# Patient Record
Sex: FEMALE | Race: WHITE | NOT HISPANIC OR LATINO | Employment: FULL TIME | URBAN - METROPOLITAN AREA
[De-identification: names, ages, dates, MRNs, and addresses within clinical notes are randomized per-mention and may not be internally consistent; named-entity substitution may affect disease eponyms.]

---

## 2020-03-23 ENCOUNTER — HOSPITAL ENCOUNTER (EMERGENCY)
Facility: HOSPITAL | Age: 40
Discharge: HOME | End: 2020-03-23
Admitting: EMERGENCY MEDICINE

## 2020-03-23 VITALS
WEIGHT: 227.74 LBS | OXYGEN SATURATION: 97 % | HEIGHT: 64 IN | SYSTOLIC BLOOD PRESSURE: 101 MMHG | BODY MASS INDEX: 38.88 KG/M2 | DIASTOLIC BLOOD PRESSURE: 67 MMHG | TEMPERATURE: 98.4 F | RESPIRATION RATE: 16 BRPM | HEART RATE: 76 BPM

## 2020-03-23 DIAGNOSIS — R11.2 NAUSEA AND VOMITING, INTRACTABILITY OF VOMITING NOT SPECIFIED, UNSPECIFIED VOMITING TYPE: ICD-10-CM

## 2020-03-23 DIAGNOSIS — R10.84 GENERALIZED ABDOMINAL PAIN: Primary | ICD-10-CM

## 2020-03-23 LAB
ANION GAP SERPL CALCULATED.3IONS-SCNC: 15 MMOL/L (ref 5–15)
BASOPHILS # BLD AUTO: 0 10*3/MM3 (ref 0–0.2)
BASOPHILS NFR BLD AUTO: 0.7 % (ref 0–1.5)
BUN BLD-MCNC: 12 MG/DL (ref 6–20)
BUN/CREAT SERPL: 16.7 (ref 7–25)
CALCIUM SPEC-SCNC: 8.4 MG/DL (ref 8.6–10.5)
CHLORIDE SERPL-SCNC: 104 MMOL/L (ref 98–107)
CO2 SERPL-SCNC: 23 MMOL/L (ref 22–29)
CREAT BLD-MCNC: 0.72 MG/DL (ref 0.57–1)
DEPRECATED RDW RBC AUTO: 40.7 FL (ref 37–54)
EOSINOPHIL # BLD AUTO: 0 10*3/MM3 (ref 0–0.4)
EOSINOPHIL NFR BLD AUTO: 0 % (ref 0.3–6.2)
ERYTHROCYTE [DISTWIDTH] IN BLOOD BY AUTOMATED COUNT: 13.5 % (ref 12.3–15.4)
GFR SERPL CREATININE-BSD FRML MDRD: 90 ML/MIN/1.73
GLUCOSE BLD-MCNC: 99 MG/DL (ref 65–99)
HCT VFR BLD AUTO: 42.6 % (ref 34–46.6)
HGB BLD-MCNC: 14.2 G/DL (ref 12–15.9)
LYMPHOCYTES # BLD AUTO: 0.8 10*3/MM3 (ref 0.7–3.1)
LYMPHOCYTES NFR BLD AUTO: 19 % (ref 19.6–45.3)
MCH RBC QN AUTO: 28.7 PG (ref 26.6–33)
MCHC RBC AUTO-ENTMCNC: 33.4 G/DL (ref 31.5–35.7)
MCV RBC AUTO: 85.9 FL (ref 79–97)
MONOCYTES # BLD AUTO: 0.4 10*3/MM3 (ref 0.1–0.9)
MONOCYTES NFR BLD AUTO: 9.6 % (ref 5–12)
NEUTROPHILS # BLD AUTO: 2.9 10*3/MM3 (ref 1.7–7)
NEUTROPHILS NFR BLD AUTO: 70.7 % (ref 42.7–76)
NRBC BLD AUTO-RTO: 0.3 /100 WBC (ref 0–0.2)
PLATELET # BLD AUTO: 208 10*3/MM3 (ref 140–450)
PMV BLD AUTO: 8.4 FL (ref 6–12)
POTASSIUM BLD-SCNC: 3.6 MMOL/L (ref 3.5–5.2)
RBC # BLD AUTO: 4.96 10*6/MM3 (ref 3.77–5.28)
SODIUM BLD-SCNC: 142 MMOL/L (ref 136–145)
WBC NRBC COR # BLD: 4 10*3/MM3 (ref 3.4–10.8)

## 2020-03-23 PROCEDURE — 96374 THER/PROPH/DIAG INJ IV PUSH: CPT

## 2020-03-23 PROCEDURE — 80048 BASIC METABOLIC PNL TOTAL CA: CPT | Performed by: NURSE PRACTITIONER

## 2020-03-23 PROCEDURE — 25010000002 ONDANSETRON PER 1 MG: Performed by: NURSE PRACTITIONER

## 2020-03-23 PROCEDURE — 99283 EMERGENCY DEPT VISIT LOW MDM: CPT

## 2020-03-23 PROCEDURE — 85025 COMPLETE CBC W/AUTO DIFF WBC: CPT | Performed by: NURSE PRACTITIONER

## 2020-03-23 RX ORDER — SODIUM CHLORIDE 0.9 % (FLUSH) 0.9 %
10 SYRINGE (ML) INJECTION AS NEEDED
Status: DISCONTINUED | OUTPATIENT
Start: 2020-03-23 | End: 2020-03-24 | Stop reason: HOSPADM

## 2020-03-23 RX ORDER — DICYCLOMINE HYDROCHLORIDE 10 MG/1
10 CAPSULE ORAL 3 TIMES DAILY PRN
Qty: 30 CAPSULE | Refills: 0 | Status: SHIPPED | OUTPATIENT
Start: 2020-03-23

## 2020-03-23 RX ORDER — ONDANSETRON 4 MG/1
4 TABLET, ORALLY DISINTEGRATING ORAL EVERY 8 HOURS PRN
Qty: 20 TABLET | Refills: 0 | Status: SHIPPED | OUTPATIENT
Start: 2020-03-23

## 2020-03-23 RX ORDER — PROMETHAZINE HYDROCHLORIDE 25 MG/1
25 SUPPOSITORY RECTAL EVERY 6 HOURS PRN
Qty: 10 SUPPOSITORY | Refills: 0 | Status: ON HOLD | OUTPATIENT
Start: 2020-03-23 | End: 2020-04-02 | Stop reason: SDUPTHER

## 2020-03-23 RX ORDER — ONDANSETRON 2 MG/ML
4 INJECTION INTRAMUSCULAR; INTRAVENOUS ONCE
Status: COMPLETED | OUTPATIENT
Start: 2020-03-23 | End: 2020-03-23

## 2020-03-23 RX ADMIN — ONDANSETRON 4 MG: 2 INJECTION INTRAMUSCULAR; INTRAVENOUS at 22:05

## 2020-03-23 RX ADMIN — SODIUM CHLORIDE 1000 ML: 900 INJECTION, SOLUTION INTRAVENOUS at 22:05

## 2020-03-24 ENCOUNTER — APPOINTMENT (OUTPATIENT)
Dept: GENERAL RADIOLOGY | Facility: HOSPITAL | Age: 40
End: 2020-03-24

## 2020-03-24 ENCOUNTER — APPOINTMENT (OUTPATIENT)
Dept: CT IMAGING | Facility: HOSPITAL | Age: 40
End: 2020-03-24

## 2020-03-24 ENCOUNTER — HOSPITAL ENCOUNTER (INPATIENT)
Facility: HOSPITAL | Age: 40
LOS: 7 days | Discharge: HOME | End: 2020-04-02
Attending: INTERNAL MEDICINE | Admitting: INTERNAL MEDICINE

## 2020-03-24 DIAGNOSIS — R50.9 FEVER, UNSPECIFIED FEVER CAUSE: ICD-10-CM

## 2020-03-24 DIAGNOSIS — J18.9 PNEUMONIA OF BOTH LOWER LOBES DUE TO INFECTIOUS ORGANISM: ICD-10-CM

## 2020-03-24 DIAGNOSIS — R11.2 NAUSEA AND VOMITING, INTRACTABILITY OF VOMITING NOT SPECIFIED, UNSPECIFIED VOMITING TYPE: ICD-10-CM

## 2020-03-24 DIAGNOSIS — U07.1 PNEUMONIA DUE TO COVID-19 VIRUS: Primary | ICD-10-CM

## 2020-03-24 DIAGNOSIS — J12.82 PNEUMONIA DUE TO COVID-19 VIRUS: Primary | ICD-10-CM

## 2020-03-24 PROBLEM — Z20.822 SUSPECTED COVID-19 VIRUS INFECTION: Status: ACTIVE | Noted: 2020-03-24

## 2020-03-24 PROBLEM — J45.909 ASTHMA: Status: ACTIVE | Noted: 2020-03-24

## 2020-03-24 LAB
ALBUMIN SERPL-MCNC: 3.7 G/DL (ref 3.5–5.2)
ALBUMIN/GLOB SERPL: 1.3 G/DL
ALP SERPL-CCNC: 84 U/L (ref 39–117)
ALT SERPL W P-5'-P-CCNC: 36 U/L (ref 1–33)
ANION GAP SERPL CALCULATED.3IONS-SCNC: 14 MMOL/L (ref 5–15)
AST SERPL-CCNC: 38 U/L (ref 1–32)
B PERT DNA SPEC QL NAA+PROBE: NOT DETECTED
B-HCG UR QL: NEGATIVE
BASOPHILS # BLD AUTO: 0 10*3/MM3 (ref 0–0.2)
BASOPHILS NFR BLD AUTO: 0.3 % (ref 0–1.5)
BILIRUB SERPL-MCNC: 0.4 MG/DL (ref 0.2–1.2)
BILIRUB UR QL STRIP: NEGATIVE
BUN BLD-MCNC: 6 MG/DL (ref 6–20)
BUN/CREAT SERPL: 10 (ref 7–25)
C PNEUM DNA NPH QL NAA+NON-PROBE: NOT DETECTED
CALCIUM SPEC-SCNC: 8.3 MG/DL (ref 8.6–10.5)
CHLORIDE SERPL-SCNC: 107 MMOL/L (ref 98–107)
CLARITY UR: CLEAR
CO2 SERPL-SCNC: 22 MMOL/L (ref 22–29)
COLOR UR: YELLOW
CREAT BLD-MCNC: 0.6 MG/DL (ref 0.57–1)
CRP SERPL-MCNC: 4.48 MG/DL (ref 0–0.5)
DEPRECATED RDW RBC AUTO: 41.1 FL (ref 37–54)
EOSINOPHIL # BLD AUTO: 0 10*3/MM3 (ref 0–0.4)
EOSINOPHIL NFR BLD AUTO: 0.1 % (ref 0.3–6.2)
ERYTHROCYTE [DISTWIDTH] IN BLOOD BY AUTOMATED COUNT: 13.6 % (ref 12.3–15.4)
FLUAV H1 2009 PAND RNA NPH QL NAA+PROBE: NOT DETECTED
FLUAV H1 HA GENE NPH QL NAA+PROBE: NOT DETECTED
FLUAV H3 RNA NPH QL NAA+PROBE: NOT DETECTED
FLUAV SUBTYP SPEC NAA+PROBE: NOT DETECTED
FLUBV RNA ISLT QL NAA+PROBE: NOT DETECTED
GFR SERPL CREATININE-BSD FRML MDRD: 111 ML/MIN/1.73
GLOBULIN UR ELPH-MCNC: 2.8 GM/DL
GLUCOSE BLD-MCNC: 85 MG/DL (ref 65–99)
GLUCOSE UR STRIP-MCNC: NEGATIVE MG/DL
HADV DNA SPEC NAA+PROBE: NOT DETECTED
HCOV 229E RNA SPEC QL NAA+PROBE: NOT DETECTED
HCOV HKU1 RNA SPEC QL NAA+PROBE: NOT DETECTED
HCOV NL63 RNA SPEC QL NAA+PROBE: NOT DETECTED
HCOV OC43 RNA SPEC QL NAA+PROBE: NOT DETECTED
HCT VFR BLD AUTO: 39.2 % (ref 34–46.6)
HGB BLD-MCNC: 13.3 G/DL (ref 12–15.9)
HGB UR QL STRIP.AUTO: NEGATIVE
HMPV RNA NPH QL NAA+NON-PROBE: NOT DETECTED
HPIV1 RNA SPEC QL NAA+PROBE: NOT DETECTED
HPIV2 RNA SPEC QL NAA+PROBE: NOT DETECTED
HPIV3 RNA NPH QL NAA+PROBE: NOT DETECTED
HPIV4 P GENE NPH QL NAA+PROBE: NOT DETECTED
KETONES UR QL STRIP: ABNORMAL
LDH SERPL-CCNC: 299 U/L (ref 135–214)
LEUKOCYTE ESTERASE UR QL STRIP.AUTO: NEGATIVE
LIPASE SERPL-CCNC: 39 U/L (ref 13–60)
LYMPHOCYTES # BLD AUTO: 0.6 10*3/MM3 (ref 0.7–3.1)
LYMPHOCYTES NFR BLD AUTO: 17.2 % (ref 19.6–45.3)
M PNEUMO IGG SER IA-ACNC: NOT DETECTED
MCH RBC QN AUTO: 29.2 PG (ref 26.6–33)
MCHC RBC AUTO-ENTMCNC: 34.1 G/DL (ref 31.5–35.7)
MCV RBC AUTO: 85.8 FL (ref 79–97)
MONOCYTES # BLD AUTO: 0.3 10*3/MM3 (ref 0.1–0.9)
MONOCYTES NFR BLD AUTO: 8.5 % (ref 5–12)
NEUTROPHILS # BLD AUTO: 2.4 10*3/MM3 (ref 1.7–7)
NEUTROPHILS NFR BLD AUTO: 73.9 % (ref 42.7–76)
NITRITE UR QL STRIP: NEGATIVE
NRBC BLD AUTO-RTO: 0 /100 WBC (ref 0–0.2)
PH UR STRIP.AUTO: 6 [PH] (ref 5–8)
PLATELET # BLD AUTO: 184 10*3/MM3 (ref 140–450)
PMV BLD AUTO: 8.6 FL (ref 6–12)
POTASSIUM BLD-SCNC: 3.6 MMOL/L (ref 3.5–5.2)
PROCALCITONIN SERPL-MCNC: 0.04 NG/ML (ref 0.1–0.25)
PROT SERPL-MCNC: 6.5 G/DL (ref 6–8.5)
PROT UR QL STRIP: NEGATIVE
RBC # BLD AUTO: 4.57 10*6/MM3 (ref 3.77–5.28)
RHINOVIRUS RNA SPEC NAA+PROBE: NOT DETECTED
RSV RNA NPH QL NAA+NON-PROBE: NOT DETECTED
SODIUM BLD-SCNC: 143 MMOL/L (ref 136–145)
SP GR UR STRIP: 1.01 (ref 1–1.03)
UROBILINOGEN UR QL STRIP: ABNORMAL
WBC NRBC COR # BLD: 3.3 10*3/MM3 (ref 3.4–10.8)

## 2020-03-24 PROCEDURE — 99284 EMERGENCY DEPT VISIT MOD MDM: CPT

## 2020-03-24 PROCEDURE — 0099U HC BIOFIRE FILMARRAY RESP PANEL 1: CPT | Performed by: NURSE PRACTITIONER

## 2020-03-24 PROCEDURE — 71045 X-RAY EXAM CHEST 1 VIEW: CPT

## 2020-03-24 PROCEDURE — 25010000002 ONDANSETRON PER 1 MG

## 2020-03-24 PROCEDURE — 25010000002 ONDANSETRON PER 1 MG: Performed by: PHYSICIAN ASSISTANT

## 2020-03-24 PROCEDURE — 84145 PROCALCITONIN (PCT): CPT | Performed by: NURSE PRACTITIONER

## 2020-03-24 PROCEDURE — 70486 CT MAXILLOFACIAL W/O DYE: CPT

## 2020-03-24 PROCEDURE — G0378 HOSPITAL OBSERVATION PER HR: HCPCS

## 2020-03-24 PROCEDURE — 25010000002 CEFTRIAXONE PER 250 MG: Performed by: NURSE PRACTITIONER

## 2020-03-24 PROCEDURE — 85025 COMPLETE CBC W/AUTO DIFF WBC: CPT | Performed by: NURSE PRACTITIONER

## 2020-03-24 PROCEDURE — U0001 2019-NCOV DIAGNOSTIC P: HCPCS | Performed by: NURSE PRACTITIONER

## 2020-03-24 PROCEDURE — 83615 LACTATE (LD) (LDH) ENZYME: CPT | Performed by: NURSE PRACTITIONER

## 2020-03-24 PROCEDURE — 74176 CT ABD & PELVIS W/O CONTRAST: CPT

## 2020-03-24 PROCEDURE — 83690 ASSAY OF LIPASE: CPT | Performed by: NURSE PRACTITIONER

## 2020-03-24 PROCEDURE — 81025 URINE PREGNANCY TEST: CPT | Performed by: NURSE PRACTITIONER

## 2020-03-24 PROCEDURE — 87040 BLOOD CULTURE FOR BACTERIA: CPT | Performed by: NURSE PRACTITIONER

## 2020-03-24 PROCEDURE — 86140 C-REACTIVE PROTEIN: CPT | Performed by: NURSE PRACTITIONER

## 2020-03-24 PROCEDURE — 87635 SARS-COV-2 COVID-19 AMP PRB: CPT | Performed by: NURSE PRACTITIONER

## 2020-03-24 PROCEDURE — 80053 COMPREHEN METABOLIC PANEL: CPT | Performed by: NURSE PRACTITIONER

## 2020-03-24 PROCEDURE — 81003 URINALYSIS AUTO W/O SCOPE: CPT | Performed by: NURSE PRACTITIONER

## 2020-03-24 PROCEDURE — 25010000002 ONDANSETRON PER 1 MG: Performed by: NURSE PRACTITIONER

## 2020-03-24 PROCEDURE — 99219 PR INITIAL OBSERVATION CARE/DAY 50 MINUTES: CPT | Performed by: PHYSICIAN ASSISTANT

## 2020-03-24 RX ORDER — PROMETHAZINE HYDROCHLORIDE 25 MG/1
25 SUPPOSITORY RECTAL EVERY 6 HOURS PRN
Status: DISCONTINUED | OUTPATIENT
Start: 2020-03-24 | End: 2020-03-30

## 2020-03-24 RX ORDER — ALBUTEROL SULFATE 90 UG/1
2 AEROSOL, METERED RESPIRATORY (INHALATION) EVERY 4 HOURS PRN
Status: DISCONTINUED | OUTPATIENT
Start: 2020-03-24 | End: 2020-03-27

## 2020-03-24 RX ORDER — CHOLECALCIFEROL (VITAMIN D3) 125 MCG
5 CAPSULE ORAL NIGHTLY PRN
Status: DISCONTINUED | OUTPATIENT
Start: 2020-03-24 | End: 2020-04-02 | Stop reason: HOSPADM

## 2020-03-24 RX ORDER — CETIRIZINE HYDROCHLORIDE 10 MG/1
10 TABLET ORAL DAILY
Status: DISCONTINUED | OUTPATIENT
Start: 2020-03-25 | End: 2020-03-30

## 2020-03-24 RX ORDER — ONDANSETRON 2 MG/ML
4 INJECTION INTRAMUSCULAR; INTRAVENOUS ONCE
Status: COMPLETED | OUTPATIENT
Start: 2020-03-24 | End: 2020-03-24

## 2020-03-24 RX ORDER — SODIUM CHLORIDE 0.9 % (FLUSH) 0.9 %
10 SYRINGE (ML) INJECTION AS NEEDED
Status: DISCONTINUED | OUTPATIENT
Start: 2020-03-24 | End: 2020-04-02 | Stop reason: HOSPADM

## 2020-03-24 RX ORDER — PANTOPRAZOLE SODIUM 40 MG/1
40 TABLET, DELAYED RELEASE ORAL DAILY
Status: DISCONTINUED | OUTPATIENT
Start: 2020-03-25 | End: 2020-04-02 | Stop reason: HOSPADM

## 2020-03-24 RX ORDER — BUDESONIDE AND FORMOTEROL FUMARATE DIHYDRATE 160; 4.5 UG/1; UG/1
2 AEROSOL RESPIRATORY (INHALATION)
Status: DISCONTINUED | OUTPATIENT
Start: 2020-03-25 | End: 2020-04-02 | Stop reason: HOSPADM

## 2020-03-24 RX ORDER — PREGABALIN 100 MG/1
100 CAPSULE ORAL EVERY 12 HOURS SCHEDULED
Status: DISCONTINUED | OUTPATIENT
Start: 2020-03-25 | End: 2020-04-02 | Stop reason: HOSPADM

## 2020-03-24 RX ORDER — ONDANSETRON 4 MG/1
4 TABLET, FILM COATED ORAL EVERY 6 HOURS PRN
Status: DISCONTINUED | OUTPATIENT
Start: 2020-03-24 | End: 2020-04-02 | Stop reason: HOSPADM

## 2020-03-24 RX ORDER — PROCHLORPERAZINE EDISYLATE 5 MG/ML
5 INJECTION INTRAMUSCULAR; INTRAVENOUS EVERY 6 HOURS PRN
Status: DISCONTINUED | OUTPATIENT
Start: 2020-03-24 | End: 2020-04-02 | Stop reason: HOSPADM

## 2020-03-24 RX ORDER — ACETAMINOPHEN 650 MG/1
650 SUPPOSITORY RECTAL EVERY 4 HOURS PRN
Status: DISCONTINUED | OUTPATIENT
Start: 2020-03-24 | End: 2020-04-02 | Stop reason: HOSPADM

## 2020-03-24 RX ORDER — CALCIUM CARBONATE 200(500)MG
2 TABLET,CHEWABLE ORAL 2 TIMES DAILY PRN
Status: DISCONTINUED | OUTPATIENT
Start: 2020-03-24 | End: 2020-04-02 | Stop reason: HOSPADM

## 2020-03-24 RX ORDER — POTASSIUM CHLORIDE 20 MEQ/1
40 TABLET, EXTENDED RELEASE ORAL AS NEEDED
Status: DISCONTINUED | OUTPATIENT
Start: 2020-03-24 | End: 2020-04-02 | Stop reason: HOSPADM

## 2020-03-24 RX ORDER — OMEPRAZOLE 40 MG/1
40 CAPSULE, DELAYED RELEASE ORAL EVERY EVENING
COMMUNITY

## 2020-03-24 RX ORDER — SODIUM CHLORIDE 9 MG/ML
75 INJECTION, SOLUTION INTRAVENOUS CONTINUOUS
Status: DISCONTINUED | OUTPATIENT
Start: 2020-03-24 | End: 2020-03-25

## 2020-03-24 RX ORDER — MONTELUKAST SODIUM 10 MG/1
10 TABLET ORAL NIGHTLY
COMMUNITY

## 2020-03-24 RX ORDER — DOCUSATE SODIUM 100 MG/1
100 CAPSULE, LIQUID FILLED ORAL 2 TIMES DAILY PRN
Status: DISCONTINUED | OUTPATIENT
Start: 2020-03-24 | End: 2020-04-02 | Stop reason: HOSPADM

## 2020-03-24 RX ORDER — SODIUM CHLORIDE 0.9 % (FLUSH) 0.9 %
10 SYRINGE (ML) INJECTION EVERY 12 HOURS SCHEDULED
Status: DISCONTINUED | OUTPATIENT
Start: 2020-03-24 | End: 2020-04-02 | Stop reason: HOSPADM

## 2020-03-24 RX ORDER — MAGNESIUM SULFATE HEPTAHYDRATE 40 MG/ML
2 INJECTION, SOLUTION INTRAVENOUS AS NEEDED
Status: DISCONTINUED | OUTPATIENT
Start: 2020-03-24 | End: 2020-04-02 | Stop reason: HOSPADM

## 2020-03-24 RX ORDER — ALUMINA, MAGNESIA, AND SIMETHICONE 2400; 2400; 240 MG/30ML; MG/30ML; MG/30ML
15 SUSPENSION ORAL EVERY 6 HOURS PRN
Status: DISCONTINUED | OUTPATIENT
Start: 2020-03-24 | End: 2020-04-02 | Stop reason: HOSPADM

## 2020-03-24 RX ORDER — SODIUM CHLORIDE 0.9 % (FLUSH) 0.9 %
10 SYRINGE (ML) INJECTION AS NEEDED
Status: DISCONTINUED | OUTPATIENT
Start: 2020-03-24 | End: 2020-03-27 | Stop reason: SDUPTHER

## 2020-03-24 RX ORDER — POTASSIUM CHLORIDE 1.5 G/1.77G
40 POWDER, FOR SOLUTION ORAL AS NEEDED
Status: DISCONTINUED | OUTPATIENT
Start: 2020-03-24 | End: 2020-04-02 | Stop reason: HOSPADM

## 2020-03-24 RX ORDER — LORATADINE 10 MG/1
10 TABLET ORAL EVERY EVENING
COMMUNITY

## 2020-03-24 RX ORDER — IPRATROPIUM BROMIDE AND ALBUTEROL SULFATE 2.5; .5 MG/3ML; MG/3ML
3 SOLUTION RESPIRATORY (INHALATION) EVERY 4 HOURS PRN
Status: DISCONTINUED | OUTPATIENT
Start: 2020-03-24 | End: 2020-03-24

## 2020-03-24 RX ORDER — ACETAMINOPHEN 160 MG/5ML
650 SOLUTION ORAL EVERY 4 HOURS PRN
Status: DISCONTINUED | OUTPATIENT
Start: 2020-03-24 | End: 2020-04-02 | Stop reason: HOSPADM

## 2020-03-24 RX ORDER — BISACODYL 10 MG
10 SUPPOSITORY, RECTAL RECTAL DAILY PRN
Status: DISCONTINUED | OUTPATIENT
Start: 2020-03-24 | End: 2020-04-02 | Stop reason: HOSPADM

## 2020-03-24 RX ORDER — ONDANSETRON 2 MG/ML
4 INJECTION INTRAMUSCULAR; INTRAVENOUS ONCE
Status: DISCONTINUED | OUTPATIENT
Start: 2020-03-24 | End: 2020-03-24

## 2020-03-24 RX ORDER — ONDANSETRON 2 MG/ML
INJECTION INTRAMUSCULAR; INTRAVENOUS
Status: COMPLETED
Start: 2020-03-24 | End: 2020-03-24

## 2020-03-24 RX ORDER — MAGNESIUM SULFATE HEPTAHYDRATE 40 MG/ML
4 INJECTION, SOLUTION INTRAVENOUS AS NEEDED
Status: DISCONTINUED | OUTPATIENT
Start: 2020-03-24 | End: 2020-04-02 | Stop reason: HOSPADM

## 2020-03-24 RX ORDER — ONDANSETRON 2 MG/ML
4 INJECTION INTRAMUSCULAR; INTRAVENOUS EVERY 6 HOURS PRN
Status: DISCONTINUED | OUTPATIENT
Start: 2020-03-24 | End: 2020-04-02 | Stop reason: HOSPADM

## 2020-03-24 RX ORDER — DICYCLOMINE HYDROCHLORIDE 10 MG/1
10 CAPSULE ORAL 3 TIMES DAILY PRN
Status: DISCONTINUED | OUTPATIENT
Start: 2020-03-24 | End: 2020-03-30

## 2020-03-24 RX ORDER — PREGABALIN 100 MG/1
100 CAPSULE ORAL 2 TIMES DAILY
COMMUNITY

## 2020-03-24 RX ORDER — ACETAMINOPHEN 500 MG
1000 TABLET ORAL ONCE
Status: COMPLETED | OUTPATIENT
Start: 2020-03-24 | End: 2020-03-24

## 2020-03-24 RX ORDER — MONTELUKAST SODIUM 10 MG/1
10 TABLET ORAL NIGHTLY
Status: DISCONTINUED | OUTPATIENT
Start: 2020-03-25 | End: 2020-04-02 | Stop reason: HOSPADM

## 2020-03-24 RX ORDER — ACETAMINOPHEN 325 MG/1
650 TABLET ORAL EVERY 4 HOURS PRN
Status: DISCONTINUED | OUTPATIENT
Start: 2020-03-24 | End: 2020-04-02 | Stop reason: HOSPADM

## 2020-03-24 RX ADMIN — ONDANSETRON 4 MG: 2 INJECTION INTRAMUSCULAR; INTRAVENOUS at 18:01

## 2020-03-24 RX ADMIN — CEFTRIAXONE SODIUM 1 G: 10 INJECTION, POWDER, FOR SOLUTION INTRAVENOUS at 22:38

## 2020-03-24 RX ADMIN — ONDANSETRON 4 MG: 2 INJECTION INTRAMUSCULAR; INTRAVENOUS at 20:05

## 2020-03-24 RX ADMIN — ONDANSETRON 4 MG: 2 INJECTION INTRAMUSCULAR; INTRAVENOUS at 23:21

## 2020-03-24 RX ADMIN — ACETAMINOPHEN 1000 MG: 500 TABLET, FILM COATED ORAL at 19:15

## 2020-03-24 RX ADMIN — SODIUM CHLORIDE 1000 ML: 900 INJECTION, SOLUTION INTRAVENOUS at 18:01

## 2020-03-24 RX ADMIN — SODIUM CHLORIDE 75 ML/HR: 900 INJECTION, SOLUTION INTRAVENOUS at 22:38

## 2020-03-24 RX ADMIN — Medication 10 ML: at 22:47

## 2020-03-25 LAB
ANION GAP SERPL CALCULATED.3IONS-SCNC: 14 MMOL/L (ref 5–15)
BASOPHILS # BLD AUTO: 0 10*3/MM3 (ref 0–0.2)
BASOPHILS NFR BLD AUTO: 0.4 % (ref 0–1.5)
BUN BLD-MCNC: 6 MG/DL (ref 6–20)
BUN/CREAT SERPL: 10 (ref 7–25)
CALCIUM SPEC-SCNC: 7.3 MG/DL (ref 8.6–10.5)
CHLORIDE SERPL-SCNC: 111 MMOL/L (ref 98–107)
CO2 SERPL-SCNC: 20 MMOL/L (ref 22–29)
CREAT BLD-MCNC: 0.6 MG/DL (ref 0.57–1)
DEPRECATED RDW RBC AUTO: 40.3 FL (ref 37–54)
EOSINOPHIL # BLD AUTO: 0 10*3/MM3 (ref 0–0.4)
EOSINOPHIL NFR BLD AUTO: 0.2 % (ref 0.3–6.2)
ERYTHROCYTE [DISTWIDTH] IN BLOOD BY AUTOMATED COUNT: 13.4 % (ref 12.3–15.4)
GFR SERPL CREATININE-BSD FRML MDRD: 111 ML/MIN/1.73
GLUCOSE BLD-MCNC: 74 MG/DL (ref 65–99)
HCT VFR BLD AUTO: 38.2 % (ref 34–46.6)
HGB BLD-MCNC: 13.1 G/DL (ref 12–15.9)
LARGE PLATELETS: NORMAL
LYMPHOCYTES # BLD AUTO: 0.6 10*3/MM3 (ref 0.7–3.1)
LYMPHOCYTES NFR BLD AUTO: 16.5 % (ref 19.6–45.3)
MCH RBC QN AUTO: 29.4 PG (ref 26.6–33)
MCHC RBC AUTO-ENTMCNC: 34.3 G/DL (ref 31.5–35.7)
MCV RBC AUTO: 85.5 FL (ref 79–97)
MONOCYTES # BLD AUTO: 0.4 10*3/MM3 (ref 0.1–0.9)
MONOCYTES NFR BLD AUTO: 9.8 % (ref 5–12)
NEUTROPHILS # BLD AUTO: 2.7 10*3/MM3 (ref 1.7–7)
NEUTROPHILS NFR BLD AUTO: 73.1 % (ref 42.7–76)
NRBC BLD AUTO-RTO: 0.5 /100 WBC (ref 0–0.2)
PLATELET # BLD AUTO: 170 10*3/MM3 (ref 140–450)
PMV BLD AUTO: 9.2 FL (ref 6–12)
POTASSIUM BLD-SCNC: 3.3 MMOL/L (ref 3.5–5.2)
POTASSIUM BLD-SCNC: 3.7 MMOL/L (ref 3.5–5.2)
RBC # BLD AUTO: 4.47 10*6/MM3 (ref 3.77–5.28)
RBC MORPH BLD: NORMAL
SODIUM BLD-SCNC: 145 MMOL/L (ref 136–145)
WBC MORPH BLD: NORMAL
WBC NRBC COR # BLD: 3.7 10*3/MM3 (ref 3.4–10.8)

## 2020-03-25 PROCEDURE — 25010000002 CEFTRIAXONE PER 250 MG: Performed by: PHYSICIAN ASSISTANT

## 2020-03-25 PROCEDURE — 84132 ASSAY OF SERUM POTASSIUM: CPT | Performed by: INTERNAL MEDICINE

## 2020-03-25 PROCEDURE — 99226 PR SBSQ OBSERVATION CARE/DAY 35 MINUTES: CPT | Performed by: INTERNAL MEDICINE

## 2020-03-25 PROCEDURE — 94799 UNLISTED PULMONARY SVC/PX: CPT

## 2020-03-25 PROCEDURE — 85025 COMPLETE CBC W/AUTO DIFF WBC: CPT | Performed by: PHYSICIAN ASSISTANT

## 2020-03-25 PROCEDURE — 25010000002 PROCHLORPERAZINE 10 MG/2ML SOLUTION: Performed by: PHYSICIAN ASSISTANT

## 2020-03-25 PROCEDURE — 25010000002 ONDANSETRON PER 1 MG: Performed by: PHYSICIAN ASSISTANT

## 2020-03-25 PROCEDURE — 25010000002 ENOXAPARIN PER 10 MG: Performed by: INTERNAL MEDICINE

## 2020-03-25 PROCEDURE — 85007 BL SMEAR W/DIFF WBC COUNT: CPT | Performed by: PHYSICIAN ASSISTANT

## 2020-03-25 PROCEDURE — 80048 BASIC METABOLIC PNL TOTAL CA: CPT | Performed by: PHYSICIAN ASSISTANT

## 2020-03-25 PROCEDURE — G0378 HOSPITAL OBSERVATION PER HR: HCPCS

## 2020-03-25 RX ORDER — AZITHROMYCIN 250 MG/1
500 TABLET, FILM COATED ORAL DAILY
Status: COMPLETED | OUTPATIENT
Start: 2020-03-25 | End: 2020-03-25

## 2020-03-25 RX ORDER — AZITHROMYCIN 250 MG/1
250 TABLET, FILM COATED ORAL EVERY 24 HOURS
Status: COMPLETED | OUTPATIENT
Start: 2020-03-26 | End: 2020-03-29

## 2020-03-25 RX ORDER — SODIUM CHLORIDE 9 MG/ML
50 INJECTION, SOLUTION INTRAVENOUS CONTINUOUS
Status: DISCONTINUED | OUTPATIENT
Start: 2020-03-25 | End: 2020-03-26

## 2020-03-25 RX ADMIN — PREGABALIN 100 MG: 100 CAPSULE ORAL at 20:42

## 2020-03-25 RX ADMIN — Medication 10 ML: at 20:42

## 2020-03-25 RX ADMIN — MONTELUKAST SODIUM 10 MG: 10 TABLET, FILM COATED ORAL at 20:42

## 2020-03-25 RX ADMIN — ACETAMINOPHEN 650 MG: 325 TABLET ORAL at 21:49

## 2020-03-25 RX ADMIN — BUDESONIDE AND FORMOTEROL FUMARATE DIHYDRATE 2 PUFF: 160; 4.5 AEROSOL RESPIRATORY (INHALATION) at 09:26

## 2020-03-25 RX ADMIN — PREGABALIN 100 MG: 100 CAPSULE ORAL at 10:53

## 2020-03-25 RX ADMIN — ALBUTEROL SULFATE 2 PUFF: 90 AEROSOL, METERED RESPIRATORY (INHALATION) at 17:49

## 2020-03-25 RX ADMIN — SODIUM CHLORIDE 50 ML/HR: 0.9 INJECTION, SOLUTION INTRAVENOUS at 20:56

## 2020-03-25 RX ADMIN — ACETAMINOPHEN 650 MG: 325 TABLET ORAL at 04:32

## 2020-03-25 RX ADMIN — SODIUM CHLORIDE 75 ML/HR: 900 INJECTION, SOLUTION INTRAVENOUS at 04:31

## 2020-03-25 RX ADMIN — ENOXAPARIN SODIUM 40 MG: 40 INJECTION SUBCUTANEOUS at 16:32

## 2020-03-25 RX ADMIN — PROCHLORPERAZINE EDISYLATE 5 MG: 5 INJECTION INTRAMUSCULAR; INTRAVENOUS at 00:06

## 2020-03-25 RX ADMIN — ALBUTEROL SULFATE 2 PUFF: 90 AEROSOL, METERED RESPIRATORY (INHALATION) at 09:26

## 2020-03-25 RX ADMIN — AZITHROMYCIN MONOHYDRATE 500 MG: 250 TABLET ORAL at 14:12

## 2020-03-25 RX ADMIN — ONDANSETRON 4 MG: 2 INJECTION INTRAMUSCULAR; INTRAVENOUS at 05:05

## 2020-03-25 RX ADMIN — BUDESONIDE AND FORMOTEROL FUMARATE DIHYDRATE 2 PUFF: 160; 4.5 AEROSOL RESPIRATORY (INHALATION) at 17:49

## 2020-03-25 RX ADMIN — CETIRIZINE HYDROCHLORIDE 10 MG: 10 TABLET, FILM COATED ORAL at 08:47

## 2020-03-25 RX ADMIN — POTASSIUM CHLORIDE 40 MEQ: 1500 TABLET, EXTENDED RELEASE ORAL at 10:53

## 2020-03-25 RX ADMIN — PANTOPRAZOLE SODIUM 40 MG: 40 TABLET, DELAYED RELEASE ORAL at 08:47

## 2020-03-25 RX ADMIN — POTASSIUM CHLORIDE 40 MEQ: 1500 TABLET, EXTENDED RELEASE ORAL at 08:47

## 2020-03-25 RX ADMIN — PROCHLORPERAZINE EDISYLATE 5 MG: 5 INJECTION INTRAMUSCULAR; INTRAVENOUS at 08:46

## 2020-03-25 RX ADMIN — CEFTRIAXONE SODIUM 1 G: 10 INJECTION, POWDER, FOR SOLUTION INTRAVENOUS at 20:42

## 2020-03-26 ENCOUNTER — APPOINTMENT (OUTPATIENT)
Dept: CT IMAGING | Facility: HOSPITAL | Age: 40
End: 2020-03-26

## 2020-03-26 LAB
ALBUMIN SERPL-MCNC: 3.4 G/DL (ref 3.5–5.2)
ALBUMIN/GLOB SERPL: 1.3 G/DL
ALP SERPL-CCNC: 79 U/L (ref 39–117)
ALT SERPL W P-5'-P-CCNC: 39 U/L (ref 1–33)
ANION GAP SERPL CALCULATED.3IONS-SCNC: 10 MMOL/L (ref 5–15)
AST SERPL-CCNC: 42 U/L (ref 1–32)
BASOPHILS # BLD AUTO: 0 10*3/MM3 (ref 0–0.2)
BASOPHILS NFR BLD AUTO: 0.6 % (ref 0–1.5)
BILIRUB SERPL-MCNC: 0.2 MG/DL (ref 0.2–1.2)
BUN BLD-MCNC: 4 MG/DL (ref 6–20)
BUN/CREAT SERPL: 6.3 (ref 7–25)
CALCIUM SPEC-SCNC: 7.8 MG/DL (ref 8.6–10.5)
CHLORIDE SERPL-SCNC: 107 MMOL/L (ref 98–107)
CO2 SERPL-SCNC: 26 MMOL/L (ref 22–29)
CREAT BLD-MCNC: 0.64 MG/DL (ref 0.57–1)
DEPRECATED RDW RBC AUTO: 41.6 FL (ref 37–54)
EOSINOPHIL # BLD AUTO: 0 10*3/MM3 (ref 0–0.4)
EOSINOPHIL NFR BLD AUTO: 0.1 % (ref 0.3–6.2)
ERYTHROCYTE [DISTWIDTH] IN BLOOD BY AUTOMATED COUNT: 13.7 % (ref 12.3–15.4)
GFR SERPL CREATININE-BSD FRML MDRD: 103 ML/MIN/1.73
GLOBULIN UR ELPH-MCNC: 2.6 GM/DL
GLUCOSE BLD-MCNC: 108 MG/DL (ref 65–99)
HCT VFR BLD AUTO: 36.9 % (ref 34–46.6)
HGB BLD-MCNC: 12.7 G/DL (ref 12–15.9)
L PNEUMO1 AG UR QL IA: NEGATIVE
LYMPHOCYTES # BLD AUTO: 1.1 10*3/MM3 (ref 0.7–3.1)
LYMPHOCYTES NFR BLD AUTO: 34.7 % (ref 19.6–45.3)
MCH RBC QN AUTO: 29.4 PG (ref 26.6–33)
MCHC RBC AUTO-ENTMCNC: 34.4 G/DL (ref 31.5–35.7)
MCV RBC AUTO: 85.6 FL (ref 79–97)
MONOCYTES # BLD AUTO: 0.4 10*3/MM3 (ref 0.1–0.9)
MONOCYTES NFR BLD AUTO: 11.5 % (ref 5–12)
NEUTROPHILS # BLD AUTO: 1.7 10*3/MM3 (ref 1.7–7)
NEUTROPHILS NFR BLD AUTO: 53.1 % (ref 42.7–76)
NRBC BLD AUTO-RTO: 0.1 /100 WBC (ref 0–0.2)
PLATELET # BLD AUTO: 190 10*3/MM3 (ref 140–450)
PMV BLD AUTO: 8.6 FL (ref 6–12)
POTASSIUM BLD-SCNC: 3.6 MMOL/L (ref 3.5–5.2)
PROT SERPL-MCNC: 6 G/DL (ref 6–8.5)
RBC # BLD AUTO: 4.32 10*6/MM3 (ref 3.77–5.28)
REF LAB TEST METHOD: ABNORMAL
S PNEUM AG SPEC QL LA: NEGATIVE
SARS-COV-2 RNA RESP QL NAA+PROBE: DETECTED
SODIUM BLD-SCNC: 143 MMOL/L (ref 136–145)
WBC NRBC COR # BLD: 3.2 10*3/MM3 (ref 3.4–10.8)

## 2020-03-26 PROCEDURE — 80053 COMPREHEN METABOLIC PANEL: CPT | Performed by: INTERNAL MEDICINE

## 2020-03-26 PROCEDURE — 94799 UNLISTED PULMONARY SVC/PX: CPT

## 2020-03-26 PROCEDURE — 25010000002 ENOXAPARIN PER 10 MG: Performed by: INTERNAL MEDICINE

## 2020-03-26 PROCEDURE — 85025 COMPLETE CBC W/AUTO DIFF WBC: CPT | Performed by: PHYSICIAN ASSISTANT

## 2020-03-26 PROCEDURE — 71250 CT THORAX DX C-: CPT

## 2020-03-26 PROCEDURE — 87899 AGENT NOS ASSAY W/OPTIC: CPT | Performed by: INTERNAL MEDICINE

## 2020-03-26 PROCEDURE — 93005 ELECTROCARDIOGRAM TRACING: CPT | Performed by: INTERNAL MEDICINE

## 2020-03-26 PROCEDURE — 99233 SBSQ HOSP IP/OBS HIGH 50: CPT | Performed by: INTERNAL MEDICINE

## 2020-03-26 RX ORDER — HYDROXYCHLOROQUINE SULFATE 200 MG/1
200 TABLET, FILM COATED ORAL EVERY 12 HOURS SCHEDULED
Status: DISCONTINUED | OUTPATIENT
Start: 2020-03-27 | End: 2020-03-27

## 2020-03-26 RX ORDER — HYDROXYCHLOROQUINE SULFATE 200 MG/1
400 TABLET, FILM COATED ORAL EVERY 12 HOURS SCHEDULED
Status: COMPLETED | OUTPATIENT
Start: 2020-03-26 | End: 2020-03-27

## 2020-03-26 RX ADMIN — ALBUTEROL SULFATE 2 PUFF: 90 AEROSOL, METERED RESPIRATORY (INHALATION) at 21:30

## 2020-03-26 RX ADMIN — PREGABALIN 100 MG: 100 CAPSULE ORAL at 08:05

## 2020-03-26 RX ADMIN — MONTELUKAST SODIUM 10 MG: 10 TABLET, FILM COATED ORAL at 20:18

## 2020-03-26 RX ADMIN — BUDESONIDE AND FORMOTEROL FUMARATE DIHYDRATE 2 PUFF: 160; 4.5 AEROSOL RESPIRATORY (INHALATION) at 06:28

## 2020-03-26 RX ADMIN — ACETAMINOPHEN 650 MG: 325 TABLET ORAL at 21:30

## 2020-03-26 RX ADMIN — PANTOPRAZOLE SODIUM 40 MG: 40 TABLET, DELAYED RELEASE ORAL at 08:04

## 2020-03-26 RX ADMIN — PREGABALIN 100 MG: 100 CAPSULE ORAL at 20:18

## 2020-03-26 RX ADMIN — Medication 10 ML: at 20:18

## 2020-03-26 RX ADMIN — BUDESONIDE AND FORMOTEROL FUMARATE DIHYDRATE 2 PUFF: 160; 4.5 AEROSOL RESPIRATORY (INHALATION) at 21:30

## 2020-03-26 RX ADMIN — CETIRIZINE HYDROCHLORIDE 10 MG: 10 TABLET, FILM COATED ORAL at 08:04

## 2020-03-26 RX ADMIN — ACETAMINOPHEN 650 MG: 325 TABLET ORAL at 17:33

## 2020-03-26 RX ADMIN — ENOXAPARIN SODIUM 40 MG: 40 INJECTION SUBCUTANEOUS at 15:35

## 2020-03-26 RX ADMIN — ACETAMINOPHEN 650 MG: 325 TABLET ORAL at 08:05

## 2020-03-26 RX ADMIN — ALBUTEROL SULFATE 2 PUFF: 90 AEROSOL, METERED RESPIRATORY (INHALATION) at 06:28

## 2020-03-26 RX ADMIN — HYDROXYCHLOROQUINE SULFATE 400 MG: 200 TABLET ORAL at 15:35

## 2020-03-26 RX ADMIN — AZITHROMYCIN MONOHYDRATE 250 MG: 250 TABLET ORAL at 13:20

## 2020-03-27 LAB
ALBUMIN SERPL-MCNC: 3.1 G/DL (ref 3.5–5.2)
ALBUMIN/GLOB SERPL: 1.2 G/DL
ALP SERPL-CCNC: 75 U/L (ref 39–117)
ALT SERPL W P-5'-P-CCNC: 40 U/L (ref 1–33)
ANION GAP SERPL CALCULATED.3IONS-SCNC: 11 MMOL/L (ref 5–15)
AST SERPL-CCNC: 38 U/L (ref 1–32)
BACTERIA UR QL AUTO: ABNORMAL /HPF
BASOPHILS # BLD AUTO: 0 10*3/MM3 (ref 0–0.2)
BASOPHILS NFR BLD AUTO: 0.3 % (ref 0–1.5)
BILIRUB SERPL-MCNC: 0.2 MG/DL (ref 0.2–1.2)
BILIRUB UR QL STRIP: NEGATIVE
BUN BLD-MCNC: 4 MG/DL (ref 6–20)
BUN/CREAT SERPL: 7.4 (ref 7–25)
CALCIUM SPEC-SCNC: 8 MG/DL (ref 8.6–10.5)
CHLORIDE SERPL-SCNC: 103 MMOL/L (ref 98–107)
CLARITY UR: CLEAR
CO2 SERPL-SCNC: 26 MMOL/L (ref 22–29)
COLOR UR: YELLOW
CREAT BLD-MCNC: 0.54 MG/DL (ref 0.57–1)
D DIMER PPP FEU-MCNC: 0.48 MCGFEU/ML (ref 0.17–0.59)
DEPRECATED RDW RBC AUTO: 41.6 FL (ref 37–54)
EOSINOPHIL # BLD AUTO: 0 10*3/MM3 (ref 0–0.4)
EOSINOPHIL NFR BLD AUTO: 0 % (ref 0.3–6.2)
ERYTHROCYTE [DISTWIDTH] IN BLOOD BY AUTOMATED COUNT: 13.7 % (ref 12.3–15.4)
FERRITIN SERPL-MCNC: 660.9 NG/ML (ref 13–150)
GFR SERPL CREATININE-BSD FRML MDRD: 126 ML/MIN/1.73
GLOBULIN UR ELPH-MCNC: 2.6 GM/DL
GLUCOSE BLD-MCNC: 119 MG/DL (ref 65–99)
GLUCOSE UR STRIP-MCNC: NEGATIVE MG/DL
HCT VFR BLD AUTO: 37.6 % (ref 34–46.6)
HGB BLD-MCNC: 12.7 G/DL (ref 12–15.9)
HGB UR QL STRIP.AUTO: ABNORMAL
HYALINE CASTS UR QL AUTO: ABNORMAL /LPF
KETONES UR QL STRIP: NEGATIVE
LEUKOCYTE ESTERASE UR QL STRIP.AUTO: NEGATIVE
LYMPHOCYTES # BLD AUTO: 0.8 10*3/MM3 (ref 0.7–3.1)
LYMPHOCYTES NFR BLD AUTO: 17.1 % (ref 19.6–45.3)
MCH RBC QN AUTO: 29.1 PG (ref 26.6–33)
MCHC RBC AUTO-ENTMCNC: 33.7 G/DL (ref 31.5–35.7)
MCV RBC AUTO: 86.3 FL (ref 79–97)
MONOCYTES # BLD AUTO: 0.4 10*3/MM3 (ref 0.1–0.9)
MONOCYTES NFR BLD AUTO: 8.1 % (ref 5–12)
NEUTROPHILS # BLD AUTO: 3.3 10*3/MM3 (ref 1.7–7)
NEUTROPHILS NFR BLD AUTO: 74.5 % (ref 42.7–76)
NITRITE UR QL STRIP: NEGATIVE
NRBC BLD AUTO-RTO: 0.1 /100 WBC (ref 0–0.2)
PH UR STRIP.AUTO: 7 [PH] (ref 5–8)
PLATELET # BLD AUTO: 187 10*3/MM3 (ref 140–450)
PMV BLD AUTO: 8.3 FL (ref 6–12)
POTASSIUM BLD-SCNC: 3.3 MMOL/L (ref 3.5–5.2)
POTASSIUM BLD-SCNC: 3.8 MMOL/L (ref 3.5–5.2)
PROT SERPL-MCNC: 5.7 G/DL (ref 6–8.5)
PROT UR QL STRIP: ABNORMAL
RBC # BLD AUTO: 4.35 10*6/MM3 (ref 3.77–5.28)
RBC # UR: ABNORMAL /HPF
REF LAB TEST METHOD: ABNORMAL
SODIUM BLD-SCNC: 140 MMOL/L (ref 136–145)
SP GR UR STRIP: 1.01 (ref 1–1.03)
SQUAMOUS #/AREA URNS HPF: ABNORMAL /HPF
UROBILINOGEN UR QL STRIP: ABNORMAL
WBC NRBC COR # BLD: 4.5 10*3/MM3 (ref 3.4–10.8)
WBC UR QL AUTO: ABNORMAL /HPF

## 2020-03-27 PROCEDURE — 25010000002 METHYLPREDNISOLONE PER 40 MG: Performed by: INTERNAL MEDICINE

## 2020-03-27 PROCEDURE — 82728 ASSAY OF FERRITIN: CPT | Performed by: INTERNAL MEDICINE

## 2020-03-27 PROCEDURE — 25010000002 ENOXAPARIN PER 10 MG: Performed by: INTERNAL MEDICINE

## 2020-03-27 PROCEDURE — 84132 ASSAY OF SERUM POTASSIUM: CPT | Performed by: INTERNAL MEDICINE

## 2020-03-27 PROCEDURE — 80053 COMPREHEN METABOLIC PANEL: CPT | Performed by: INTERNAL MEDICINE

## 2020-03-27 PROCEDURE — 94799 UNLISTED PULMONARY SVC/PX: CPT

## 2020-03-27 PROCEDURE — 63710000001 ONDANSETRON PER 8 MG: Performed by: PHYSICIAN ASSISTANT

## 2020-03-27 PROCEDURE — 99233 SBSQ HOSP IP/OBS HIGH 50: CPT | Performed by: INTERNAL MEDICINE

## 2020-03-27 PROCEDURE — 81001 URINALYSIS AUTO W/SCOPE: CPT | Performed by: INTERNAL MEDICINE

## 2020-03-27 PROCEDURE — 93005 ELECTROCARDIOGRAM TRACING: CPT | Performed by: INTERNAL MEDICINE

## 2020-03-27 PROCEDURE — 25010000002 PROCHLORPERAZINE 10 MG/2ML SOLUTION: Performed by: PHYSICIAN ASSISTANT

## 2020-03-27 PROCEDURE — 85025 COMPLETE CBC W/AUTO DIFF WBC: CPT | Performed by: PHYSICIAN ASSISTANT

## 2020-03-27 PROCEDURE — 85379 FIBRIN DEGRADATION QUANT: CPT | Performed by: INTERNAL MEDICINE

## 2020-03-27 RX ORDER — ALBUTEROL SULFATE 90 UG/1
2 AEROSOL, METERED RESPIRATORY (INHALATION)
Status: DISCONTINUED | OUTPATIENT
Start: 2020-03-27 | End: 2020-04-02 | Stop reason: HOSPADM

## 2020-03-27 RX ORDER — SODIUM CHLORIDE 9 MG/ML
30 INJECTION, SOLUTION INTRAVENOUS CONTINUOUS
Status: DISCONTINUED | OUTPATIENT
Start: 2020-03-27 | End: 2020-04-01

## 2020-03-27 RX ORDER — METHYLPREDNISOLONE SODIUM SUCCINATE 40 MG/ML
40 INJECTION, POWDER, LYOPHILIZED, FOR SOLUTION INTRAMUSCULAR; INTRAVENOUS ONCE
Status: COMPLETED | OUTPATIENT
Start: 2020-03-27 | End: 2020-03-27

## 2020-03-27 RX ADMIN — CETIRIZINE HYDROCHLORIDE 10 MG: 10 TABLET, FILM COATED ORAL at 08:45

## 2020-03-27 RX ADMIN — ALBUTEROL SULFATE 2 PUFF: 90 AEROSOL, METERED RESPIRATORY (INHALATION) at 18:55

## 2020-03-27 RX ADMIN — PROCHLORPERAZINE EDISYLATE 5 MG: 5 INJECTION INTRAMUSCULAR; INTRAVENOUS at 12:59

## 2020-03-27 RX ADMIN — ACETAMINOPHEN 650 MG: 325 TABLET ORAL at 09:27

## 2020-03-27 RX ADMIN — HYDROXYCHLOROQUINE SULFATE 400 MG: 200 TABLET ORAL at 08:46

## 2020-03-27 RX ADMIN — ENOXAPARIN SODIUM 40 MG: 40 INJECTION SUBCUTANEOUS at 17:09

## 2020-03-27 RX ADMIN — ALBUTEROL SULFATE 2 PUFF: 90 AEROSOL, METERED RESPIRATORY (INHALATION) at 00:01

## 2020-03-27 RX ADMIN — ALBUTEROL SULFATE 2 PUFF: 90 AEROSOL, METERED RESPIRATORY (INHALATION) at 14:50

## 2020-03-27 RX ADMIN — DICYCLOMINE HYDROCHLORIDE 10 MG: 10 CAPSULE ORAL at 20:01

## 2020-03-27 RX ADMIN — Medication 10 ML: at 20:01

## 2020-03-27 RX ADMIN — ALBUTEROL SULFATE 2 PUFF: 90 AEROSOL, METERED RESPIRATORY (INHALATION) at 11:20

## 2020-03-27 RX ADMIN — AZITHROMYCIN MONOHYDRATE 250 MG: 250 TABLET ORAL at 12:59

## 2020-03-27 RX ADMIN — NYSTATIN 500000 UNITS: 500000 SUSPENSION ORAL at 17:09

## 2020-03-27 RX ADMIN — PANTOPRAZOLE SODIUM 40 MG: 40 TABLET, DELAYED RELEASE ORAL at 08:45

## 2020-03-27 RX ADMIN — POTASSIUM CHLORIDE 40 MEQ: 1500 TABLET, EXTENDED RELEASE ORAL at 08:46

## 2020-03-27 RX ADMIN — BUDESONIDE AND FORMOTEROL FUMARATE DIHYDRATE 2 PUFF: 160; 4.5 AEROSOL RESPIRATORY (INHALATION) at 18:56

## 2020-03-27 RX ADMIN — POTASSIUM CHLORIDE 40 MEQ: 1500 TABLET, EXTENDED RELEASE ORAL at 17:10

## 2020-03-27 RX ADMIN — BUDESONIDE AND FORMOTEROL FUMARATE DIHYDRATE 2 PUFF: 160; 4.5 AEROSOL RESPIRATORY (INHALATION) at 07:15

## 2020-03-27 RX ADMIN — PREGABALIN 100 MG: 100 CAPSULE ORAL at 08:46

## 2020-03-27 RX ADMIN — MONTELUKAST SODIUM 10 MG: 10 TABLET, FILM COATED ORAL at 20:01

## 2020-03-27 RX ADMIN — ALBUTEROL SULFATE 2 PUFF: 90 AEROSOL, METERED RESPIRATORY (INHALATION) at 07:15

## 2020-03-27 RX ADMIN — CALCIUM CARBONATE (ANTACID) CHEW TAB 500 MG 2 TABLET: 500 CHEW TAB at 20:01

## 2020-03-27 RX ADMIN — ACETAMINOPHEN 650 MG: 325 TABLET ORAL at 20:01

## 2020-03-27 RX ADMIN — ALBUTEROL SULFATE 2 PUFF: 90 AEROSOL, METERED RESPIRATORY (INHALATION) at 22:47

## 2020-03-27 RX ADMIN — PROMETHAZINE HYDROCHLORIDE 25 MG: 25 SUPPOSITORY RECTAL at 09:27

## 2020-03-27 RX ADMIN — ALBUTEROL SULFATE 2 PUFF: 90 AEROSOL, METERED RESPIRATORY (INHALATION) at 03:24

## 2020-03-27 RX ADMIN — Medication 10 ML: at 08:46

## 2020-03-27 RX ADMIN — NYSTATIN 500000 UNITS: 500000 SUSPENSION ORAL at 12:59

## 2020-03-27 RX ADMIN — SODIUM CHLORIDE 30 ML/HR: 900 INJECTION, SOLUTION INTRAVENOUS at 20:01

## 2020-03-27 RX ADMIN — METHYLPREDNISOLONE SODIUM SUCCINATE 40 MG: 40 INJECTION, POWDER, FOR SOLUTION INTRAMUSCULAR; INTRAVENOUS at 12:59

## 2020-03-27 RX ADMIN — NYSTATIN 500000 UNITS: 500000 SUSPENSION ORAL at 20:01

## 2020-03-27 RX ADMIN — ONDANSETRON HYDROCHLORIDE 4 MG: 4 TABLET, FILM COATED ORAL at 08:45

## 2020-03-27 RX ADMIN — PREGABALIN 100 MG: 100 CAPSULE ORAL at 20:01

## 2020-03-28 LAB
ALBUMIN SERPL-MCNC: 3.3 G/DL (ref 3.5–5.2)
ALBUMIN/GLOB SERPL: 1 G/DL
ALP SERPL-CCNC: 82 U/L (ref 39–117)
ALT SERPL W P-5'-P-CCNC: 65 U/L (ref 1–33)
ANION GAP SERPL CALCULATED.3IONS-SCNC: 12 MMOL/L (ref 5–15)
AST SERPL-CCNC: 66 U/L (ref 1–32)
BASOPHILS # BLD AUTO: 0 10*3/MM3 (ref 0–0.2)
BASOPHILS NFR BLD AUTO: 0.3 % (ref 0–1.5)
BILIRUB SERPL-MCNC: 0.2 MG/DL (ref 0.2–1.2)
BUN BLD-MCNC: 5 MG/DL (ref 6–20)
BUN/CREAT SERPL: 8.6 (ref 7–25)
CALCIUM SPEC-SCNC: 8.7 MG/DL (ref 8.6–10.5)
CHLORIDE SERPL-SCNC: 105 MMOL/L (ref 98–107)
CO2 SERPL-SCNC: 26 MMOL/L (ref 22–29)
CREAT BLD-MCNC: 0.58 MG/DL (ref 0.57–1)
DEPRECATED RDW RBC AUTO: 42.9 FL (ref 37–54)
EOSINOPHIL # BLD AUTO: 0 10*3/MM3 (ref 0–0.4)
EOSINOPHIL NFR BLD AUTO: 0 % (ref 0.3–6.2)
ERYTHROCYTE [DISTWIDTH] IN BLOOD BY AUTOMATED COUNT: 13.9 % (ref 12.3–15.4)
GFR SERPL CREATININE-BSD FRML MDRD: 116 ML/MIN/1.73
GLOBULIN UR ELPH-MCNC: 3.2 GM/DL
GLUCOSE BLD-MCNC: 132 MG/DL (ref 65–99)
HCT VFR BLD AUTO: 38.8 % (ref 34–46.6)
HGB BLD-MCNC: 13.2 G/DL (ref 12–15.9)
LYMPHOCYTES # BLD AUTO: 0.5 10*3/MM3 (ref 0.7–3.1)
LYMPHOCYTES NFR BLD AUTO: 17 % (ref 19.6–45.3)
MCH RBC QN AUTO: 29.4 PG (ref 26.6–33)
MCHC RBC AUTO-ENTMCNC: 34 G/DL (ref 31.5–35.7)
MCV RBC AUTO: 86.4 FL (ref 79–97)
MONOCYTES # BLD AUTO: 0.3 10*3/MM3 (ref 0.1–0.9)
MONOCYTES NFR BLD AUTO: 10.7 % (ref 5–12)
NEUTROPHILS # BLD AUTO: 2.1 10*3/MM3 (ref 1.7–7)
NEUTROPHILS NFR BLD AUTO: 72 % (ref 42.7–76)
NRBC BLD AUTO-RTO: 0.1 /100 WBC (ref 0–0.2)
PLATELET # BLD AUTO: 218 10*3/MM3 (ref 140–450)
PMV BLD AUTO: 8.3 FL (ref 6–12)
POTASSIUM BLD-SCNC: 4.3 MMOL/L (ref 3.5–5.2)
PROT SERPL-MCNC: 6.5 G/DL (ref 6–8.5)
RBC # BLD AUTO: 4.49 10*6/MM3 (ref 3.77–5.28)
SODIUM BLD-SCNC: 143 MMOL/L (ref 136–145)
WBC NRBC COR # BLD: 3 10*3/MM3 (ref 3.4–10.8)

## 2020-03-28 PROCEDURE — 93010 ELECTROCARDIOGRAM REPORT: CPT | Performed by: INTERNAL MEDICINE

## 2020-03-28 PROCEDURE — 25010000002 ENOXAPARIN PER 10 MG: Performed by: INTERNAL MEDICINE

## 2020-03-28 PROCEDURE — 94799 UNLISTED PULMONARY SVC/PX: CPT

## 2020-03-28 PROCEDURE — C1751 CATH, INF, PER/CENT/MIDLINE: HCPCS

## 2020-03-28 PROCEDURE — 85025 COMPLETE CBC W/AUTO DIFF WBC: CPT | Performed by: PHYSICIAN ASSISTANT

## 2020-03-28 PROCEDURE — 99232 SBSQ HOSP IP/OBS MODERATE 35: CPT | Performed by: INTERNAL MEDICINE

## 2020-03-28 PROCEDURE — 25010000002 PROCHLORPERAZINE 10 MG/2ML SOLUTION: Performed by: PHYSICIAN ASSISTANT

## 2020-03-28 PROCEDURE — 80053 COMPREHEN METABOLIC PANEL: CPT | Performed by: INTERNAL MEDICINE

## 2020-03-28 PROCEDURE — 36410 VNPNXR 3YR/> PHY/QHP DX/THER: CPT

## 2020-03-28 RX ORDER — GUAIFENESIN AND DEXTROMETHORPHAN HYDROBROMIDE 600; 30 MG/1; MG/1
1 TABLET, EXTENDED RELEASE ORAL 2 TIMES DAILY
Status: DISCONTINUED | OUTPATIENT
Start: 2020-03-28 | End: 2020-04-01

## 2020-03-28 RX ORDER — SODIUM CHLORIDE 0.9 % (FLUSH) 0.9 %
10 SYRINGE (ML) INJECTION EVERY 12 HOURS SCHEDULED
Status: DISCONTINUED | OUTPATIENT
Start: 2020-03-28 | End: 2020-04-02 | Stop reason: HOSPADM

## 2020-03-28 RX ORDER — SODIUM CHLORIDE 0.9 % (FLUSH) 0.9 %
10 SYRINGE (ML) INJECTION AS NEEDED
Status: DISCONTINUED | OUTPATIENT
Start: 2020-03-28 | End: 2020-04-02 | Stop reason: HOSPADM

## 2020-03-28 RX ORDER — BENZONATATE 100 MG/1
100 CAPSULE ORAL 3 TIMES DAILY PRN
Status: DISCONTINUED | OUTPATIENT
Start: 2020-03-28 | End: 2020-04-01

## 2020-03-28 RX ORDER — BUMETANIDE 0.25 MG/ML
1 INJECTION INTRAMUSCULAR; INTRAVENOUS ONCE
Status: COMPLETED | OUTPATIENT
Start: 2020-03-28 | End: 2020-03-28

## 2020-03-28 RX ADMIN — CETIRIZINE HYDROCHLORIDE 10 MG: 10 TABLET, FILM COATED ORAL at 09:23

## 2020-03-28 RX ADMIN — BUDESONIDE AND FORMOTEROL FUMARATE DIHYDRATE 2 PUFF: 160; 4.5 AEROSOL RESPIRATORY (INHALATION) at 06:28

## 2020-03-28 RX ADMIN — BENZONATATE 100 MG: 100 CAPSULE ORAL at 20:23

## 2020-03-28 RX ADMIN — NYSTATIN 500000 UNITS: 500000 SUSPENSION ORAL at 20:23

## 2020-03-28 RX ADMIN — ACETAMINOPHEN 650 MG: 325 TABLET ORAL at 22:20

## 2020-03-28 RX ADMIN — BUMETANIDE 1 MG: 0.25 INJECTION INTRAMUSCULAR; INTRAVENOUS at 17:17

## 2020-03-28 RX ADMIN — ALBUTEROL SULFATE 2 PUFF: 90 AEROSOL, METERED RESPIRATORY (INHALATION) at 15:30

## 2020-03-28 RX ADMIN — GUAIFENESIN AND DEXTROMETHORPHAN HYDROBROMIDE 1 TABLET: 600; 30 TABLET, EXTENDED RELEASE ORAL at 14:42

## 2020-03-28 RX ADMIN — Medication 10 ML: at 08:15

## 2020-03-28 RX ADMIN — Medication 10 ML: at 14:42

## 2020-03-28 RX ADMIN — NYSTATIN 500000 UNITS: 500000 SUSPENSION ORAL at 17:17

## 2020-03-28 RX ADMIN — PROCHLORPERAZINE EDISYLATE 5 MG: 5 INJECTION INTRAMUSCULAR; INTRAVENOUS at 22:41

## 2020-03-28 RX ADMIN — NYSTATIN 500000 UNITS: 500000 SUSPENSION ORAL at 12:32

## 2020-03-28 RX ADMIN — ALBUTEROL SULFATE 2 PUFF: 90 AEROSOL, METERED RESPIRATORY (INHALATION) at 23:26

## 2020-03-28 RX ADMIN — NYSTATIN 500000 UNITS: 500000 SUSPENSION ORAL at 09:23

## 2020-03-28 RX ADMIN — BENZONATATE 100 MG: 100 CAPSULE ORAL at 12:32

## 2020-03-28 RX ADMIN — MONTELUKAST SODIUM 10 MG: 10 TABLET, FILM COATED ORAL at 20:23

## 2020-03-28 RX ADMIN — GUAIFENESIN AND DEXTROMETHORPHAN HYDROBROMIDE 1 TABLET: 600; 30 TABLET, EXTENDED RELEASE ORAL at 20:22

## 2020-03-28 RX ADMIN — PREGABALIN 100 MG: 100 CAPSULE ORAL at 20:22

## 2020-03-28 RX ADMIN — PANTOPRAZOLE SODIUM 40 MG: 40 TABLET, DELAYED RELEASE ORAL at 09:23

## 2020-03-28 RX ADMIN — ALBUTEROL SULFATE 2 PUFF: 90 AEROSOL, METERED RESPIRATORY (INHALATION) at 11:17

## 2020-03-28 RX ADMIN — PREGABALIN 100 MG: 100 CAPSULE ORAL at 09:23

## 2020-03-28 RX ADMIN — ALBUTEROL SULFATE 2 PUFF: 90 AEROSOL, METERED RESPIRATORY (INHALATION) at 06:28

## 2020-03-28 RX ADMIN — ALBUTEROL SULFATE 2 PUFF: 90 AEROSOL, METERED RESPIRATORY (INHALATION) at 21:48

## 2020-03-28 RX ADMIN — BUDESONIDE AND FORMOTEROL FUMARATE DIHYDRATE 2 PUFF: 160; 4.5 AEROSOL RESPIRATORY (INHALATION) at 21:48

## 2020-03-28 RX ADMIN — Medication 10 ML: at 20:10

## 2020-03-28 RX ADMIN — ACETAMINOPHEN 650 MG: 325 TABLET ORAL at 17:16

## 2020-03-28 RX ADMIN — PROCHLORPERAZINE EDISYLATE 5 MG: 5 INJECTION INTRAMUSCULAR; INTRAVENOUS at 17:23

## 2020-03-28 RX ADMIN — ALBUTEROL SULFATE 2 PUFF: 90 AEROSOL, METERED RESPIRATORY (INHALATION) at 03:37

## 2020-03-28 RX ADMIN — AZITHROMYCIN MONOHYDRATE 250 MG: 250 TABLET ORAL at 12:32

## 2020-03-28 RX ADMIN — ENOXAPARIN SODIUM 40 MG: 40 INJECTION SUBCUTANEOUS at 17:16

## 2020-03-29 LAB
ANION GAP SERPL CALCULATED.3IONS-SCNC: 11 MMOL/L (ref 5–15)
BACTERIA SPEC AEROBE CULT: NORMAL
BACTERIA SPEC AEROBE CULT: NORMAL
BASOPHILS # BLD AUTO: 0 10*3/MM3 (ref 0–0.2)
BASOPHILS NFR BLD AUTO: 0.3 % (ref 0–1.5)
BUN BLD-MCNC: 6 MG/DL (ref 6–20)
BUN/CREAT SERPL: 10 (ref 7–25)
CALCIUM SPEC-SCNC: 8.5 MG/DL (ref 8.6–10.5)
CHLORIDE SERPL-SCNC: 104 MMOL/L (ref 98–107)
CK SERPL-CCNC: 72 U/L (ref 20–180)
CO2 SERPL-SCNC: 27 MMOL/L (ref 22–29)
CREAT BLD-MCNC: 0.6 MG/DL (ref 0.57–1)
DEPRECATED RDW RBC AUTO: 42 FL (ref 37–54)
EOSINOPHIL # BLD AUTO: 0 10*3/MM3 (ref 0–0.4)
EOSINOPHIL NFR BLD AUTO: 0.1 % (ref 0.3–6.2)
ERYTHROCYTE [DISTWIDTH] IN BLOOD BY AUTOMATED COUNT: 13.8 % (ref 12.3–15.4)
FERRITIN SERPL-MCNC: 1175 NG/ML (ref 13–150)
GFR SERPL CREATININE-BSD FRML MDRD: 111 ML/MIN/1.73
GLUCOSE BLD-MCNC: 111 MG/DL (ref 65–99)
HCT VFR BLD AUTO: 34.8 % (ref 34–46.6)
HGB BLD-MCNC: 12 G/DL (ref 12–15.9)
LDH SERPL-CCNC: 467 U/L (ref 135–214)
LYMPHOCYTES # BLD AUTO: 0.9 10*3/MM3 (ref 0.7–3.1)
LYMPHOCYTES NFR BLD AUTO: 10.7 % (ref 19.6–45.3)
MCH RBC QN AUTO: 29.6 PG (ref 26.6–33)
MCHC RBC AUTO-ENTMCNC: 34.5 G/DL (ref 31.5–35.7)
MCV RBC AUTO: 85.6 FL (ref 79–97)
MONOCYTES # BLD AUTO: 0.6 10*3/MM3 (ref 0.1–0.9)
MONOCYTES NFR BLD AUTO: 6.5 % (ref 5–12)
NEUTROPHILS # BLD AUTO: 7.1 10*3/MM3 (ref 1.7–7)
NEUTROPHILS NFR BLD AUTO: 82.4 % (ref 42.7–76)
NRBC BLD AUTO-RTO: 0 /100 WBC (ref 0–0.2)
PLATELET # BLD AUTO: 259 10*3/MM3 (ref 140–450)
PMV BLD AUTO: 8.8 FL (ref 6–12)
POTASSIUM BLD-SCNC: 4 MMOL/L (ref 3.5–5.2)
RBC # BLD AUTO: 4.06 10*6/MM3 (ref 3.77–5.28)
SODIUM BLD-SCNC: 142 MMOL/L (ref 136–145)
WBC NRBC COR # BLD: 8.6 10*3/MM3 (ref 3.4–10.8)

## 2020-03-29 PROCEDURE — 85025 COMPLETE CBC W/AUTO DIFF WBC: CPT | Performed by: INTERNAL MEDICINE

## 2020-03-29 PROCEDURE — 82550 ASSAY OF CK (CPK): CPT | Performed by: INTERNAL MEDICINE

## 2020-03-29 PROCEDURE — 94799 UNLISTED PULMONARY SVC/PX: CPT

## 2020-03-29 PROCEDURE — 83615 LACTATE (LD) (LDH) ENZYME: CPT | Performed by: INTERNAL MEDICINE

## 2020-03-29 PROCEDURE — 80048 BASIC METABOLIC PNL TOTAL CA: CPT | Performed by: INTERNAL MEDICINE

## 2020-03-29 PROCEDURE — 82728 ASSAY OF FERRITIN: CPT | Performed by: INTERNAL MEDICINE

## 2020-03-29 PROCEDURE — 25010000002 ENOXAPARIN PER 10 MG: Performed by: INTERNAL MEDICINE

## 2020-03-29 PROCEDURE — 25010000002 PROCHLORPERAZINE 10 MG/2ML SOLUTION: Performed by: PHYSICIAN ASSISTANT

## 2020-03-29 PROCEDURE — 99232 SBSQ HOSP IP/OBS MODERATE 35: CPT | Performed by: INTERNAL MEDICINE

## 2020-03-29 RX ADMIN — HYOSCYAMINE SULFATE 125 MCG: 0.12 TABLET SUBLINGUAL at 14:03

## 2020-03-29 RX ADMIN — BUDESONIDE AND FORMOTEROL FUMARATE DIHYDRATE 2 PUFF: 160; 4.5 AEROSOL RESPIRATORY (INHALATION) at 06:42

## 2020-03-29 RX ADMIN — BENZONATATE 100 MG: 100 CAPSULE ORAL at 21:00

## 2020-03-29 RX ADMIN — MONTELUKAST SODIUM 10 MG: 10 TABLET, FILM COATED ORAL at 20:44

## 2020-03-29 RX ADMIN — GUAIFENESIN AND DEXTROMETHORPHAN HYDROBROMIDE 1 TABLET: 600; 30 TABLET, EXTENDED RELEASE ORAL at 09:48

## 2020-03-29 RX ADMIN — ALBUTEROL SULFATE 2 PUFF: 90 AEROSOL, METERED RESPIRATORY (INHALATION) at 10:40

## 2020-03-29 RX ADMIN — Medication 10 ML: at 20:46

## 2020-03-29 RX ADMIN — GUAIFENESIN AND DEXTROMETHORPHAN HYDROBROMIDE 1 TABLET: 600; 30 TABLET, EXTENDED RELEASE ORAL at 20:44

## 2020-03-29 RX ADMIN — HYOSCYAMINE SULFATE 125 MCG: 0.12 TABLET SUBLINGUAL at 09:48

## 2020-03-29 RX ADMIN — Medication 10 ML: at 09:48

## 2020-03-29 RX ADMIN — NYSTATIN 500000 UNITS: 500000 SUSPENSION ORAL at 14:02

## 2020-03-29 RX ADMIN — BENZONATATE 100 MG: 100 CAPSULE ORAL at 05:47

## 2020-03-29 RX ADMIN — ALBUTEROL SULFATE 2 PUFF: 90 AEROSOL, METERED RESPIRATORY (INHALATION) at 15:02

## 2020-03-29 RX ADMIN — PREGABALIN 100 MG: 100 CAPSULE ORAL at 09:47

## 2020-03-29 RX ADMIN — HYOSCYAMINE SULFATE 125 MCG: 0.12 TABLET SUBLINGUAL at 20:44

## 2020-03-29 RX ADMIN — ACETAMINOPHEN 650 MG: 325 TABLET ORAL at 14:02

## 2020-03-29 RX ADMIN — BUDESONIDE AND FORMOTEROL FUMARATE DIHYDRATE 2 PUFF: 160; 4.5 AEROSOL RESPIRATORY (INHALATION) at 18:40

## 2020-03-29 RX ADMIN — BENZONATATE 100 MG: 100 CAPSULE ORAL at 14:02

## 2020-03-29 RX ADMIN — PROCHLORPERAZINE EDISYLATE 5 MG: 5 INJECTION INTRAMUSCULAR; INTRAVENOUS at 20:45

## 2020-03-29 RX ADMIN — CETIRIZINE HYDROCHLORIDE 10 MG: 10 TABLET, FILM COATED ORAL at 09:47

## 2020-03-29 RX ADMIN — ALBUTEROL SULFATE 2 PUFF: 90 AEROSOL, METERED RESPIRATORY (INHALATION) at 03:50

## 2020-03-29 RX ADMIN — ALBUTEROL SULFATE 2 PUFF: 90 AEROSOL, METERED RESPIRATORY (INHALATION) at 06:42

## 2020-03-29 RX ADMIN — ACETAMINOPHEN 650 MG: 325 TABLET ORAL at 09:47

## 2020-03-29 RX ADMIN — ALBUTEROL SULFATE 2 PUFF: 90 AEROSOL, METERED RESPIRATORY (INHALATION) at 23:34

## 2020-03-29 RX ADMIN — PREGABALIN 100 MG: 100 CAPSULE ORAL at 20:45

## 2020-03-29 RX ADMIN — NYSTATIN 500000 UNITS: 500000 SUSPENSION ORAL at 09:47

## 2020-03-29 RX ADMIN — AZITHROMYCIN MONOHYDRATE 250 MG: 250 TABLET ORAL at 14:02

## 2020-03-29 RX ADMIN — ACETAMINOPHEN 650 MG: 325 TABLET ORAL at 20:44

## 2020-03-29 RX ADMIN — NYSTATIN 500000 UNITS: 500000 SUSPENSION ORAL at 20:44

## 2020-03-29 RX ADMIN — ACETAMINOPHEN 650 MG: 325 TABLET ORAL at 02:16

## 2020-03-29 RX ADMIN — ENOXAPARIN SODIUM 40 MG: 40 INJECTION SUBCUTANEOUS at 17:57

## 2020-03-29 RX ADMIN — NYSTATIN 500000 UNITS: 500000 SUSPENSION ORAL at 17:57

## 2020-03-29 RX ADMIN — PANTOPRAZOLE SODIUM 40 MG: 40 TABLET, DELAYED RELEASE ORAL at 09:47

## 2020-03-29 RX ADMIN — ALBUTEROL SULFATE 2 PUFF: 90 AEROSOL, METERED RESPIRATORY (INHALATION) at 18:40

## 2020-03-30 PROBLEM — E44.0 MODERATE PROTEIN-CALORIE MALNUTRITION: Chronic | Status: ACTIVE | Noted: 2020-03-30

## 2020-03-30 PROBLEM — U07.1 COVID-19 VIRUS INFECTION: Status: ACTIVE | Noted: 2020-03-24

## 2020-03-30 PROBLEM — J12.9 VIRAL PNEUMONIA: Status: ACTIVE | Noted: 2020-03-24

## 2020-03-30 PROBLEM — J96.01 ACUTE RESPIRATORY FAILURE WITH HYPOXIA (HCC): Status: ACTIVE | Noted: 2020-03-30

## 2020-03-30 LAB
ANION GAP SERPL CALCULATED.3IONS-SCNC: 13 MMOL/L (ref 5–15)
BASOPHILS # BLD AUTO: 0 10*3/MM3 (ref 0–0.2)
BASOPHILS NFR BLD AUTO: 0.1 % (ref 0–1.5)
BUN BLD-MCNC: 6 MG/DL (ref 6–20)
BUN/CREAT SERPL: 10 (ref 7–25)
CALCIUM SPEC-SCNC: 8.7 MG/DL (ref 8.6–10.5)
CHLORIDE SERPL-SCNC: 100 MMOL/L (ref 98–107)
CO2 SERPL-SCNC: 28 MMOL/L (ref 22–29)
CREAT BLD-MCNC: 0.6 MG/DL (ref 0.57–1)
DEPRECATED RDW RBC AUTO: 42 FL (ref 37–54)
EOSINOPHIL # BLD AUTO: 0 10*3/MM3 (ref 0–0.4)
EOSINOPHIL NFR BLD AUTO: 0.6 % (ref 0.3–6.2)
ERYTHROCYTE [DISTWIDTH] IN BLOOD BY AUTOMATED COUNT: 13.9 % (ref 12.3–15.4)
GFR SERPL CREATININE-BSD FRML MDRD: 111 ML/MIN/1.73
GLUCOSE BLD-MCNC: 92 MG/DL (ref 65–99)
HCT VFR BLD AUTO: 34.1 % (ref 34–46.6)
HGB BLD-MCNC: 11.6 G/DL (ref 12–15.9)
LYMPHOCYTES # BLD AUTO: 0.9 10*3/MM3 (ref 0.7–3.1)
LYMPHOCYTES NFR BLD AUTO: 18.2 % (ref 19.6–45.3)
MCH RBC QN AUTO: 29.3 PG (ref 26.6–33)
MCHC RBC AUTO-ENTMCNC: 33.9 G/DL (ref 31.5–35.7)
MCV RBC AUTO: 86.2 FL (ref 79–97)
MONOCYTES # BLD AUTO: 0.4 10*3/MM3 (ref 0.1–0.9)
MONOCYTES NFR BLD AUTO: 9 % (ref 5–12)
NEUTROPHILS # BLD AUTO: 3.6 10*3/MM3 (ref 1.7–7)
NEUTROPHILS NFR BLD AUTO: 72.1 % (ref 42.7–76)
NRBC BLD AUTO-RTO: 0.1 /100 WBC (ref 0–0.2)
NT-PROBNP SERPL-MCNC: 19.4 PG/ML (ref 5–450)
PLATELET # BLD AUTO: 279 10*3/MM3 (ref 140–450)
PMV BLD AUTO: 8.5 FL (ref 6–12)
POTASSIUM BLD-SCNC: 3.6 MMOL/L (ref 3.5–5.2)
RBC # BLD AUTO: 3.95 10*6/MM3 (ref 3.77–5.28)
SODIUM BLD-SCNC: 141 MMOL/L (ref 136–145)
WBC NRBC COR # BLD: 5 10*3/MM3 (ref 3.4–10.8)

## 2020-03-30 PROCEDURE — 85025 COMPLETE CBC W/AUTO DIFF WBC: CPT | Performed by: INTERNAL MEDICINE

## 2020-03-30 PROCEDURE — 25010000002 ENOXAPARIN PER 10 MG: Performed by: INTERNAL MEDICINE

## 2020-03-30 PROCEDURE — 94799 UNLISTED PULMONARY SVC/PX: CPT

## 2020-03-30 PROCEDURE — 99233 SBSQ HOSP IP/OBS HIGH 50: CPT | Performed by: INTERNAL MEDICINE

## 2020-03-30 PROCEDURE — 80048 BASIC METABOLIC PNL TOTAL CA: CPT | Performed by: INTERNAL MEDICINE

## 2020-03-30 PROCEDURE — 83880 ASSAY OF NATRIURETIC PEPTIDE: CPT | Performed by: INTERNAL MEDICINE

## 2020-03-30 RX ORDER — PROMETHAZINE HYDROCHLORIDE 12.5 MG/1
12.5 SUPPOSITORY RECTAL EVERY 6 HOURS PRN
Status: DISCONTINUED | OUTPATIENT
Start: 2020-03-30 | End: 2020-04-02 | Stop reason: HOSPADM

## 2020-03-30 RX ADMIN — Medication 10 ML: at 20:33

## 2020-03-30 RX ADMIN — GUAIFENESIN AND DEXTROMETHORPHAN HYDROBROMIDE 1 TABLET: 600; 30 TABLET, EXTENDED RELEASE ORAL at 09:09

## 2020-03-30 RX ADMIN — PREGABALIN 100 MG: 100 CAPSULE ORAL at 20:33

## 2020-03-30 RX ADMIN — BENZONATATE 100 MG: 100 CAPSULE ORAL at 23:16

## 2020-03-30 RX ADMIN — ALBUTEROL SULFATE 2 PUFF: 90 AEROSOL, METERED RESPIRATORY (INHALATION) at 06:30

## 2020-03-30 RX ADMIN — BENZONATATE 100 MG: 100 CAPSULE ORAL at 05:44

## 2020-03-30 RX ADMIN — PREGABALIN 100 MG: 100 CAPSULE ORAL at 09:09

## 2020-03-30 RX ADMIN — Medication 10 ML: at 09:10

## 2020-03-30 RX ADMIN — GUAIFENESIN AND DEXTROMETHORPHAN HYDROBROMIDE 1 TABLET: 600; 30 TABLET, EXTENDED RELEASE ORAL at 20:33

## 2020-03-30 RX ADMIN — Medication 10 ML: at 09:12

## 2020-03-30 RX ADMIN — MONTELUKAST SODIUM 10 MG: 10 TABLET, FILM COATED ORAL at 20:33

## 2020-03-30 RX ADMIN — ENOXAPARIN SODIUM 40 MG: 40 INJECTION SUBCUTANEOUS at 17:03

## 2020-03-30 RX ADMIN — ALBUTEROL SULFATE 2 PUFF: 90 AEROSOL, METERED RESPIRATORY (INHALATION) at 23:08

## 2020-03-30 RX ADMIN — PANTOPRAZOLE SODIUM 40 MG: 40 TABLET, DELAYED RELEASE ORAL at 09:17

## 2020-03-30 RX ADMIN — ALBUTEROL SULFATE 2 PUFF: 90 AEROSOL, METERED RESPIRATORY (INHALATION) at 19:36

## 2020-03-30 RX ADMIN — ALBUTEROL SULFATE 2 PUFF: 90 AEROSOL, METERED RESPIRATORY (INHALATION) at 14:45

## 2020-03-30 RX ADMIN — HYOSCYAMINE SULFATE 125 MCG: 0.12 TABLET SUBLINGUAL at 05:44

## 2020-03-30 RX ADMIN — NYSTATIN 500000 UNITS: 500000 SUSPENSION ORAL at 17:03

## 2020-03-30 RX ADMIN — ACETAMINOPHEN 650 MG: 325 TABLET ORAL at 17:11

## 2020-03-30 RX ADMIN — BUDESONIDE AND FORMOTEROL FUMARATE DIHYDRATE 2 PUFF: 160; 4.5 AEROSOL RESPIRATORY (INHALATION) at 19:36

## 2020-03-30 RX ADMIN — BENZONATATE 100 MG: 100 CAPSULE ORAL at 14:24

## 2020-03-30 RX ADMIN — CETIRIZINE HYDROCHLORIDE 10 MG: 10 TABLET, FILM COATED ORAL at 09:09

## 2020-03-30 RX ADMIN — NYSTATIN 500000 UNITS: 500000 SUSPENSION ORAL at 09:09

## 2020-03-30 RX ADMIN — BUDESONIDE AND FORMOTEROL FUMARATE DIHYDRATE 2 PUFF: 160; 4.5 AEROSOL RESPIRATORY (INHALATION) at 06:30

## 2020-03-30 RX ADMIN — NYSTATIN 500000 UNITS: 500000 SUSPENSION ORAL at 12:19

## 2020-03-31 PROBLEM — J12.82 PNEUMONIA DUE TO COVID-19 VIRUS: Status: ACTIVE | Noted: 2020-03-24

## 2020-03-31 PROBLEM — E66.01 MORBIDLY OBESE: Chronic | Status: ACTIVE | Noted: 2020-03-31

## 2020-03-31 LAB
ALBUMIN SERPL-MCNC: 3.1 G/DL (ref 3.5–5.2)
ALBUMIN/GLOB SERPL: 1 G/DL
ALP SERPL-CCNC: 85 U/L (ref 39–117)
ALT SERPL W P-5'-P-CCNC: 86 U/L (ref 1–33)
ANION GAP SERPL CALCULATED.3IONS-SCNC: 12 MMOL/L (ref 5–15)
AST SERPL-CCNC: 49 U/L (ref 1–32)
BASOPHILS # BLD AUTO: 0 10*3/MM3 (ref 0–0.2)
BASOPHILS NFR BLD AUTO: 0.3 % (ref 0–1.5)
BILIRUB SERPL-MCNC: 0.2 MG/DL (ref 0.2–1.2)
BUN BLD-MCNC: 6 MG/DL (ref 6–20)
BUN/CREAT SERPL: 11.5 (ref 7–25)
CALCIUM SPEC-SCNC: 8.8 MG/DL (ref 8.6–10.5)
CHLORIDE SERPL-SCNC: 103 MMOL/L (ref 98–107)
CK SERPL-CCNC: 38 U/L (ref 20–180)
CO2 SERPL-SCNC: 28 MMOL/L (ref 22–29)
CREAT BLD-MCNC: 0.52 MG/DL (ref 0.57–1)
CRP SERPL-MCNC: 6.33 MG/DL (ref 0–0.5)
D DIMER PPP FEU-MCNC: 0.59 MCGFEU/ML (ref 0.17–0.59)
DEPRECATED RDW RBC AUTO: 41.1 FL (ref 37–54)
EOSINOPHIL # BLD AUTO: 0.1 10*3/MM3 (ref 0–0.4)
EOSINOPHIL NFR BLD AUTO: 1.2 % (ref 0.3–6.2)
ERYTHROCYTE [DISTWIDTH] IN BLOOD BY AUTOMATED COUNT: 13.6 % (ref 12.3–15.4)
FERRITIN SERPL-MCNC: 746.7 NG/ML (ref 13–150)
GFR SERPL CREATININE-BSD FRML MDRD: 131 ML/MIN/1.73
GLOBULIN UR ELPH-MCNC: 3.1 GM/DL
GLUCOSE BLD-MCNC: 95 MG/DL (ref 65–99)
HCT VFR BLD AUTO: 33.8 % (ref 34–46.6)
HGB BLD-MCNC: 11.4 G/DL (ref 12–15.9)
LDH SERPL-CCNC: 548 U/L (ref 135–214)
LYMPHOCYTES # BLD AUTO: 0.8 10*3/MM3 (ref 0.7–3.1)
LYMPHOCYTES NFR BLD AUTO: 17.2 % (ref 19.6–45.3)
MCH RBC QN AUTO: 28.9 PG (ref 26.6–33)
MCHC RBC AUTO-ENTMCNC: 33.6 G/DL (ref 31.5–35.7)
MCV RBC AUTO: 86.1 FL (ref 79–97)
MONOCYTES # BLD AUTO: 0.5 10*3/MM3 (ref 0.1–0.9)
MONOCYTES NFR BLD AUTO: 10.3 % (ref 5–12)
NEUTROPHILS # BLD AUTO: 3.4 10*3/MM3 (ref 1.7–7)
NEUTROPHILS NFR BLD AUTO: 71 % (ref 42.7–76)
NRBC BLD AUTO-RTO: 0 /100 WBC (ref 0–0.2)
PLATELET # BLD AUTO: 300 10*3/MM3 (ref 140–450)
PMV BLD AUTO: 8.3 FL (ref 6–12)
POTASSIUM BLD-SCNC: 3.8 MMOL/L (ref 3.5–5.2)
PROT SERPL-MCNC: 6.2 G/DL (ref 6–8.5)
RBC # BLD AUTO: 3.93 10*6/MM3 (ref 3.77–5.28)
SODIUM BLD-SCNC: 143 MMOL/L (ref 136–145)
TROPONIN T SERPL-MCNC: <0.01 NG/ML (ref 0–0.03)
WBC NRBC COR # BLD: 4.8 10*3/MM3 (ref 3.4–10.8)

## 2020-03-31 PROCEDURE — 94799 UNLISTED PULMONARY SVC/PX: CPT

## 2020-03-31 PROCEDURE — 63710000001 ONDANSETRON PER 8 MG: Performed by: PHYSICIAN ASSISTANT

## 2020-03-31 PROCEDURE — 83615 LACTATE (LD) (LDH) ENZYME: CPT | Performed by: INTERNAL MEDICINE

## 2020-03-31 PROCEDURE — 25010000002 ENOXAPARIN PER 10 MG: Performed by: INTERNAL MEDICINE

## 2020-03-31 PROCEDURE — 82550 ASSAY OF CK (CPK): CPT | Performed by: INTERNAL MEDICINE

## 2020-03-31 PROCEDURE — 85025 COMPLETE CBC W/AUTO DIFF WBC: CPT | Performed by: INTERNAL MEDICINE

## 2020-03-31 PROCEDURE — 25010000002 PROCHLORPERAZINE 10 MG/2ML SOLUTION: Performed by: PHYSICIAN ASSISTANT

## 2020-03-31 PROCEDURE — 80053 COMPREHEN METABOLIC PANEL: CPT | Performed by: INTERNAL MEDICINE

## 2020-03-31 PROCEDURE — 85379 FIBRIN DEGRADATION QUANT: CPT | Performed by: INTERNAL MEDICINE

## 2020-03-31 PROCEDURE — 36592 COLLECT BLOOD FROM PICC: CPT

## 2020-03-31 PROCEDURE — 99233 SBSQ HOSP IP/OBS HIGH 50: CPT | Performed by: INTERNAL MEDICINE

## 2020-03-31 PROCEDURE — 82728 ASSAY OF FERRITIN: CPT | Performed by: INTERNAL MEDICINE

## 2020-03-31 PROCEDURE — 84484 ASSAY OF TROPONIN QUANT: CPT | Performed by: INTERNAL MEDICINE

## 2020-03-31 PROCEDURE — 86140 C-REACTIVE PROTEIN: CPT | Performed by: INTERNAL MEDICINE

## 2020-03-31 RX ADMIN — Medication 10 ML: at 09:14

## 2020-03-31 RX ADMIN — ALBUTEROL SULFATE 2 PUFF: 90 AEROSOL, METERED RESPIRATORY (INHALATION) at 18:36

## 2020-03-31 RX ADMIN — GUAIFENESIN AND DEXTROMETHORPHAN HYDROBROMIDE 1 TABLET: 600; 30 TABLET, EXTENDED RELEASE ORAL at 09:13

## 2020-03-31 RX ADMIN — ALBUTEROL SULFATE 2 PUFF: 90 AEROSOL, METERED RESPIRATORY (INHALATION) at 12:20

## 2020-03-31 RX ADMIN — PREGABALIN 100 MG: 100 CAPSULE ORAL at 09:14

## 2020-03-31 RX ADMIN — PANTOPRAZOLE SODIUM 40 MG: 40 TABLET, DELAYED RELEASE ORAL at 09:14

## 2020-03-31 RX ADMIN — ENOXAPARIN SODIUM 40 MG: 40 INJECTION SUBCUTANEOUS at 15:01

## 2020-03-31 RX ADMIN — BUDESONIDE AND FORMOTEROL FUMARATE DIHYDRATE 2 PUFF: 160; 4.5 AEROSOL RESPIRATORY (INHALATION) at 18:36

## 2020-03-31 RX ADMIN — ACETAMINOPHEN 650 MG: 325 TABLET ORAL at 15:02

## 2020-03-31 RX ADMIN — ONDANSETRON HYDROCHLORIDE 4 MG: 4 TABLET, FILM COATED ORAL at 20:26

## 2020-03-31 RX ADMIN — BUDESONIDE AND FORMOTEROL FUMARATE DIHYDRATE 2 PUFF: 160; 4.5 AEROSOL RESPIRATORY (INHALATION) at 07:00

## 2020-03-31 RX ADMIN — PREGABALIN 100 MG: 100 CAPSULE ORAL at 20:26

## 2020-03-31 RX ADMIN — ALBUTEROL SULFATE 2 PUFF: 90 AEROSOL, METERED RESPIRATORY (INHALATION) at 15:35

## 2020-03-31 RX ADMIN — PROCHLORPERAZINE EDISYLATE 5 MG: 5 INJECTION INTRAMUSCULAR; INTRAVENOUS at 02:57

## 2020-03-31 RX ADMIN — Medication 10 ML: at 20:27

## 2020-03-31 RX ADMIN — ALBUTEROL SULFATE 2 PUFF: 90 AEROSOL, METERED RESPIRATORY (INHALATION) at 23:45

## 2020-03-31 RX ADMIN — ALBUTEROL SULFATE 2 PUFF: 90 AEROSOL, METERED RESPIRATORY (INHALATION) at 07:00

## 2020-03-31 RX ADMIN — ONDANSETRON HYDROCHLORIDE 4 MG: 4 TABLET, FILM COATED ORAL at 15:02

## 2020-03-31 RX ADMIN — Medication 10 ML: at 09:15

## 2020-03-31 RX ADMIN — Medication 10 ML: at 20:26

## 2020-03-31 RX ADMIN — MONTELUKAST SODIUM 10 MG: 10 TABLET, FILM COATED ORAL at 20:26

## 2020-03-31 RX ADMIN — ACETAMINOPHEN 650 MG: 325 TABLET ORAL at 20:26

## 2020-03-31 RX ADMIN — BENZONATATE 100 MG: 100 CAPSULE ORAL at 09:13

## 2020-04-01 LAB
ALBUMIN SERPL-MCNC: 2.9 G/DL (ref 3.5–5.2)
ALBUMIN/GLOB SERPL: 0.9 G/DL
ALP SERPL-CCNC: 114 U/L (ref 39–117)
ALT SERPL W P-5'-P-CCNC: 141 U/L (ref 1–33)
ANION GAP SERPL CALCULATED.3IONS-SCNC: 13 MMOL/L (ref 5–15)
AST SERPL-CCNC: 113 U/L (ref 1–32)
BASOPHILS # BLD AUTO: 0 10*3/MM3 (ref 0–0.2)
BASOPHILS NFR BLD AUTO: 0.3 % (ref 0–1.5)
BILIRUB SERPL-MCNC: 0.2 MG/DL (ref 0.2–1.2)
BUN BLD-MCNC: 5 MG/DL (ref 6–20)
BUN/CREAT SERPL: 9.8 (ref 7–25)
CALCIUM SPEC-SCNC: 8.7 MG/DL (ref 8.6–10.5)
CHLORIDE SERPL-SCNC: 100 MMOL/L (ref 98–107)
CK SERPL-CCNC: 28 U/L (ref 20–180)
CO2 SERPL-SCNC: 25 MMOL/L (ref 22–29)
CREAT BLD-MCNC: 0.51 MG/DL (ref 0.57–1)
CRP SERPL-MCNC: 3.12 MG/DL (ref 0–0.5)
D DIMER PPP FEU-MCNC: 0.51 MCGFEU/ML (ref 0.17–0.59)
DEPRECATED RDW RBC AUTO: 44.6 FL (ref 37–54)
EOSINOPHIL # BLD AUTO: 0.1 10*3/MM3 (ref 0–0.4)
EOSINOPHIL NFR BLD AUTO: 2.1 % (ref 0.3–6.2)
ERYTHROCYTE [DISTWIDTH] IN BLOOD BY AUTOMATED COUNT: 14.2 % (ref 12.3–15.4)
FERRITIN SERPL-MCNC: 1030 NG/ML (ref 13–150)
GFR SERPL CREATININE-BSD FRML MDRD: 134 ML/MIN/1.73
GLOBULIN UR ELPH-MCNC: 3.3 GM/DL
GLUCOSE BLD-MCNC: 96 MG/DL (ref 65–99)
HCT VFR BLD AUTO: 35.7 % (ref 34–46.6)
HGB BLD-MCNC: 12 G/DL (ref 12–15.9)
LDH SERPL-CCNC: 424 U/L (ref 135–214)
LYMPHOCYTES # BLD AUTO: 0.9 10*3/MM3 (ref 0.7–3.1)
LYMPHOCYTES NFR BLD AUTO: 16.8 % (ref 19.6–45.3)
MCH RBC QN AUTO: 29.6 PG (ref 26.6–33)
MCHC RBC AUTO-ENTMCNC: 33.5 G/DL (ref 31.5–35.7)
MCV RBC AUTO: 88.3 FL (ref 79–97)
MONOCYTES # BLD AUTO: 0.6 10*3/MM3 (ref 0.1–0.9)
MONOCYTES NFR BLD AUTO: 11.3 % (ref 5–12)
NEUTROPHILS # BLD AUTO: 3.8 10*3/MM3 (ref 1.7–7)
NEUTROPHILS NFR BLD AUTO: 69.5 % (ref 42.7–76)
NRBC BLD AUTO-RTO: 0 /100 WBC (ref 0–0.2)
PLATELET # BLD AUTO: 334 10*3/MM3 (ref 140–450)
PMV BLD AUTO: 8.3 FL (ref 6–12)
POTASSIUM BLD-SCNC: 3.8 MMOL/L (ref 3.5–5.2)
PROT SERPL-MCNC: 6.2 G/DL (ref 6–8.5)
RBC # BLD AUTO: 4.04 10*6/MM3 (ref 3.77–5.28)
SODIUM BLD-SCNC: 138 MMOL/L (ref 136–145)
TROPONIN T SERPL-MCNC: <0.01 NG/ML (ref 0–0.03)
WBC NRBC COR # BLD: 5.4 10*3/MM3 (ref 3.4–10.8)

## 2020-04-01 PROCEDURE — 86140 C-REACTIVE PROTEIN: CPT | Performed by: INTERNAL MEDICINE

## 2020-04-01 PROCEDURE — 99232 SBSQ HOSP IP/OBS MODERATE 35: CPT | Performed by: INTERNAL MEDICINE

## 2020-04-01 PROCEDURE — 94799 UNLISTED PULMONARY SVC/PX: CPT

## 2020-04-01 PROCEDURE — 85025 COMPLETE CBC W/AUTO DIFF WBC: CPT | Performed by: INTERNAL MEDICINE

## 2020-04-01 PROCEDURE — 25010000002 ENOXAPARIN PER 10 MG: Performed by: INTERNAL MEDICINE

## 2020-04-01 PROCEDURE — 80053 COMPREHEN METABOLIC PANEL: CPT | Performed by: INTERNAL MEDICINE

## 2020-04-01 PROCEDURE — 83615 LACTATE (LD) (LDH) ENZYME: CPT | Performed by: INTERNAL MEDICINE

## 2020-04-01 PROCEDURE — 82550 ASSAY OF CK (CPK): CPT | Performed by: INTERNAL MEDICINE

## 2020-04-01 PROCEDURE — 85379 FIBRIN DEGRADATION QUANT: CPT | Performed by: INTERNAL MEDICINE

## 2020-04-01 PROCEDURE — 63710000001 ONDANSETRON PER 8 MG: Performed by: PHYSICIAN ASSISTANT

## 2020-04-01 PROCEDURE — 82728 ASSAY OF FERRITIN: CPT | Performed by: INTERNAL MEDICINE

## 2020-04-01 PROCEDURE — 84484 ASSAY OF TROPONIN QUANT: CPT | Performed by: INTERNAL MEDICINE

## 2020-04-01 RX ORDER — GUAIFENESIN AND CODEINE PHOSPHATE 100; 10 MG/5ML; MG/5ML
5 SOLUTION ORAL EVERY 4 HOURS PRN
Status: DISCONTINUED | OUTPATIENT
Start: 2020-04-01 | End: 2020-04-02 | Stop reason: HOSPADM

## 2020-04-01 RX ORDER — DICYCLOMINE HYDROCHLORIDE 10 MG/1
10 CAPSULE ORAL 3 TIMES DAILY PRN
Status: DISCONTINUED | OUTPATIENT
Start: 2020-04-01 | End: 2020-04-01

## 2020-04-01 RX ORDER — BENZONATATE 100 MG/1
200 CAPSULE ORAL 3 TIMES DAILY PRN
Status: DISCONTINUED | OUTPATIENT
Start: 2020-04-01 | End: 2020-04-02 | Stop reason: HOSPADM

## 2020-04-01 RX ORDER — CETIRIZINE HYDROCHLORIDE 10 MG/1
10 TABLET ORAL DAILY PRN
Status: DISCONTINUED | OUTPATIENT
Start: 2020-04-01 | End: 2020-04-01

## 2020-04-01 RX ADMIN — ALBUTEROL SULFATE 2 PUFF: 90 AEROSOL, METERED RESPIRATORY (INHALATION) at 23:23

## 2020-04-01 RX ADMIN — CALCIUM CARBONATE (ANTACID) CHEW TAB 500 MG 2 TABLET: 500 CHEW TAB at 23:16

## 2020-04-01 RX ADMIN — Medication 10 ML: at 07:58

## 2020-04-01 RX ADMIN — PREGABALIN 100 MG: 100 CAPSULE ORAL at 21:48

## 2020-04-01 RX ADMIN — Medication 10 ML: at 21:49

## 2020-04-01 RX ADMIN — BUDESONIDE AND FORMOTEROL FUMARATE DIHYDRATE 2 PUFF: 160; 4.5 AEROSOL RESPIRATORY (INHALATION) at 19:44

## 2020-04-01 RX ADMIN — ALBUTEROL SULFATE 2 PUFF: 90 AEROSOL, METERED RESPIRATORY (INHALATION) at 15:36

## 2020-04-01 RX ADMIN — ALBUTEROL SULFATE 2 PUFF: 90 AEROSOL, METERED RESPIRATORY (INHALATION) at 03:30

## 2020-04-01 RX ADMIN — MONTELUKAST SODIUM 10 MG: 10 TABLET, FILM COATED ORAL at 21:48

## 2020-04-01 RX ADMIN — BENZONATATE 100 MG: 100 CAPSULE ORAL at 07:56

## 2020-04-01 RX ADMIN — ALBUTEROL SULFATE 2 PUFF: 90 AEROSOL, METERED RESPIRATORY (INHALATION) at 07:18

## 2020-04-01 RX ADMIN — ALBUTEROL SULFATE 2 PUFF: 90 AEROSOL, METERED RESPIRATORY (INHALATION) at 11:32

## 2020-04-01 RX ADMIN — BENZONATATE 100 MG: 100 CAPSULE ORAL at 14:46

## 2020-04-01 RX ADMIN — ONDANSETRON HYDROCHLORIDE 4 MG: 4 TABLET, FILM COATED ORAL at 16:06

## 2020-04-01 RX ADMIN — BUDESONIDE AND FORMOTEROL FUMARATE DIHYDRATE 2 PUFF: 160; 4.5 AEROSOL RESPIRATORY (INHALATION) at 07:18

## 2020-04-01 RX ADMIN — GUAIFENESIN AND DEXTROMETHORPHAN HYDROBROMIDE 1 TABLET: 600; 30 TABLET, EXTENDED RELEASE ORAL at 07:55

## 2020-04-01 RX ADMIN — ACETAMINOPHEN 650 MG: 325 TABLET ORAL at 07:59

## 2020-04-01 RX ADMIN — Medication 10 ML: at 07:57

## 2020-04-01 RX ADMIN — ENOXAPARIN SODIUM 40 MG: 40 INJECTION SUBCUTANEOUS at 16:06

## 2020-04-01 RX ADMIN — PREGABALIN 100 MG: 100 CAPSULE ORAL at 07:55

## 2020-04-01 RX ADMIN — ALBUTEROL SULFATE 2 PUFF: 90 AEROSOL, METERED RESPIRATORY (INHALATION) at 19:44

## 2020-04-01 RX ADMIN — Medication 10 ML: at 21:50

## 2020-04-01 RX ADMIN — PANTOPRAZOLE SODIUM 40 MG: 40 TABLET, DELAYED RELEASE ORAL at 07:56

## 2020-04-01 NOTE — PROGRESS NOTES
KPA/PULM/CC PROGRESS NOTE       SUBJECTIVE    40 y.o. with a past medical history of asthma and fibromyalgia admitted to [facility] 3/24 complaining of cough, fever, chest pain, SOA, vomiting x 4 days.  The patient was seen in the ED last night for vomiting and was discharged home with phenergan suppositories and Bentyl.  She states the Bentyl helped with abdominal cramping, but she was unable to figure out how to use the phenergan.  She states she has had fevers between  F at home.  She states she does have a high risk daughter and they were recently at [facility].  She reports her daughter has spinal muscular dystrophy. She was admitted and tested positive for SARS CoV-2 ( COVID 19)      CT abd/pelvis: bilateral lower lobe groundglass opacities seen with viral pneumonia.     3/28 SOA better.   3/29 + DELUCA with minimal activity   3/30: Remains with dry cough. SOA stable. O2 requirement improved.  3/31: Afebrile. SOA stable. Continues to stay lethargic and needs assistance in mobility. O2 requirement stable at 3L. Dry Cough+  4/1: Awake.  Currently on 2 L nasal cannula.  Complains of some cough with thick phlegm.  Positive shortness of breath with activity.  Denies any nausea or vomiting.  Typical bedtime is around 9 PM.  She gets up at 5:30 AM.  Positive snoring.    OBJECTIVE    Vitals:    04/01/20 1132 04/01/20 1134 04/01/20 1304 04/01/20 1330   BP:   100/68    BP Location:   Right arm    Patient Position:   Sitting    Pulse: 78 89 91    Resp: 16 16 16    Temp:   97.9 °F (36.6 °C)    TempSrc:   Oral    SpO2: 94%  93% 92%   Weight:       Height:          Intake/Output last 3 shifts:  I/O last 3 completed shifts:  In: 4176 [P.O.:3560; I.V.:616]  Out: -   Intake/Output this shift:  No intake/output data recorded.    Intake/Output Summary (Last 24 hours) at 4/1/2020 1451  Last data filed at 4/1/2020 0352  Gross per 24 hour   Intake 1120 ml   Output --   Net 1120 ml        General Appearance: Awake, no distress, appears stated age  Head:  Normocephalic, without obvious abnormality, atraumatic, Mallampati 3  Eyes: conjunctiva/corneas clear , EOMI  Neck:  Supple,  no adenopathy, no JVD or bruit      Lungs: Good air entry bilaterally, no crackles or wheezes  Chest wall:  No tenderness  Heart:  Regular rate and rhythm, S1 and S2 normal, no murmur, rub or gallop  Abdomen:  Soft, non-tender, bowel sounds active all four quadrants,  no masses, no hepatomegaly, no splenomegaly  Extremities:  Extremities normal, no cyanosis or edema  Pulses: 2+ and symmetric all extremities  Skin:  No rashes or lesions  Neurologic: Awake, 5 x 5 power in all extremities.  Cranial nerves II through XII grossly intact.      Scheduled Meds:    albuterol sulfate HFA 2 puff Inhalation Q4H - RT   budesonide-formoterol 2 puff Inhalation BID - RT   enoxaparin 40 mg Subcutaneous Q24H   guaifenesin-dextromethorphan 1 tablet Oral BID   montelukast 10 mg Oral Nightly   pantoprazole 40 mg Oral Daily   pregabalin 100 mg Oral Q12H   sodium chloride 10 mL Intravenous Q12H   sodium chloride 10 mL Intravenous Q12H       Continuous Infusions:    Pharmacy Consult     sodium chloride 30 mL/hr Last Rate: Stopped (03/28/20 0715)       PRN Meds:•  acetaminophen **OR** acetaminophen **OR** acetaminophen  •  aluminum-magnesium hydroxide-simethicone  •  benzonatate  •  bisacodyl  •  calcium carbonate  •  docusate sodium  •  magnesium hydroxide  •  magnesium sulfate **OR** magnesium sulfate **OR** magnesium sulfate  •  melatonin  •  ondansetron **OR** ondansetron  •  Pharmacy Consult  •  potassium chloride  •  potassium chloride  •  prochlorperazine  •  promethazine  •  sodium chloride  •  sodium chloride     LABS    Lab Results (last 24 hours)     Procedure Component Value Units Date/Time    Lactate Dehydrogenase [[id number]]  (Abnormal) Collected:  04/01/20 0350    Specimen:  Blood Updated:  04/01/20 0458      U/L       Comment: Specimen hemolyzed.  Results may be affected.       Ferritin [[id number]]  (Abnormal) Collected:  04/01/20 0350    Specimen:  Blood Updated:  04/01/20 0458     Ferritin 1,030.00 ng/mL     Narrative:       Results may be falsely decreased if patient taking Biotin.      Troponin [[id number]]  (Normal) Collected:  04/01/20 0350    Specimen:  Blood Updated:  04/01/20 0458     Troponin T <0.010 ng/mL     Narrative:       Troponin T Reference Range:  <= 0.03 ng/mL-   Negative for AMI  >0.03 ng/mL-     Abnormal for myocardial necrosis.  Clinicians would have to utilize clinical acumen, EKG, Troponin and serial changes to determine if it is an Acute Myocardial Infarction or myocardial injury due to an underlying chronic condition.       Results may be falsely decreased if patient taking Biotin.      C-reactive Protein [[id number]]  (Abnormal) Collected:  04/01/20 0350    Specimen:  Blood Updated:  04/01/20 0451     C-Reactive Protein 3.12 mg/dL     Comprehensive Metabolic Panel [[id number]]  (Abnormal) Collected:  04/01/20 0350    Specimen:  Blood Updated:  04/01/20 0451     Glucose 96 mg/dL      BUN 5 mg/dL      Creatinine 0.51 mg/dL      Sodium 138 mmol/L      Potassium 3.8 mmol/L      Chloride 100 mmol/L      CO2 25.0 mmol/L      Calcium 8.7 mg/dL      Total Protein 6.2 g/dL      Albumin 2.90 g/dL      ALT (SGPT) 141 U/L      AST (SGOT) 113 U/L      Alkaline Phosphatase 114 U/L      Total Bilirubin 0.2 mg/dL      eGFR Non African Amer 134 mL/min/1.73      Globulin 3.3 gm/dL      A/G Ratio 0.9 g/dL      BUN/Creatinine Ratio 9.8     Anion Gap 13.0 mmol/L     Narrative:       GFR Normal >60  Chronic Kidney Disease <60  Kidney Failure <15      CK [[id number]]  (Normal) Collected:  04/01/20 0350    Specimen:  Blood Updated:  04/01/20 0451     Creatine Kinase 28 U/L     D-dimer, Quantitative [[id number]]  (Normal) Collected:  04/01/20 0350    Specimen:  Blood Updated:  04/01/20 0433     D-Dimer, Quantitative 0.51 MCGFEU/mL      Narrative:       Reference Range  --------------------------------------------------------------------     < 0.50   Negative Predictive Value  0.50-0.59   Indeterminate    >= 0.60   Probable VTE             A very low percentage of patients with DVT may yield D-Dimer results   below the cut-off of 0.50 MCGFEU/mL.  This is known to be more   prevalent in patients with distal DVT.             Results of this test should always be interpreted in conjunction with   the patient's medical history, clinical presentation and other   findings.  Clinical diagnosis should not be based on the result of   INNOVANCE D-Dimer alone.    CBC & Differential [[id number]] Collected:  04/01/20 0350    Specimen:  Blood Updated:  04/01/20 0417    Narrative:       The following orders were created for panel order CBC & Differential.  Procedure                               Abnormality         Status                     ---------                               -----------         ------                     CBC Auto Differential[[id number]]        Abnormal            Final result                 Please view results for these tests on the individual orders.    CBC Auto Differential [[id number]]  (Abnormal) Collected:  04/01/20 0350    Specimen:  Blood Updated:  04/01/20 0417     WBC 5.40 10*3/mm3      RBC 4.04 10*6/mm3      Hemoglobin 12.0 g/dL      Hematocrit 35.7 %      MCV 88.3 fL      MCH 29.6 pg      MCHC 33.5 g/dL      RDW 14.2 %      RDW-SD 44.6 fl      MPV 8.3 fL      Platelets 334 10*3/mm3      Neutrophil % 69.5 %      Lymphocyte % 16.8 %      Monocyte % 11.3 %      Eosinophil % 2.1 %      Basophil % 0.3 %      Neutrophils, Absolute 3.80 10*3/mm3      Lymphocytes, Absolute 0.90 10*3/mm3      Monocytes, Absolute 0.60 10*3/mm3      Eosinophils, Absolute 0.10 10*3/mm3      Basophils, Absolute 0.00 10*3/mm3      nRBC 0.0 /100 WBC          Microbiology Results (last 10 days)     Procedure Component Value - Date/Time    Legionella Antigen,  Urine - Urine, Urine, Clean Catch [[id number]]  (Normal) Collected:  03/26/20 1539    Lab Status:  Final result Specimen:  Urine, Clean Catch Updated:  03/26/20 1607     LEGIONELLA ANTIGEN, URINE Negative    S. Pneumo Ag Urine or CSF - Urine, Urine, Clean Catch [[id number]]  (Normal) Collected:  03/26/20 1539    Lab Status:  Final result Specimen:  Urine, Clean Catch Updated:  03/26/20 1607     Strep Pneumo Ag Negative    Blood Culture - Blood, Arm, Right [[id number]] Collected:  03/24/20 2057    Lab Status:  Final result Specimen:  Blood from Arm, Right Updated:  03/29/20 2100     Blood Culture No growth at 5 days    Blood Culture - Blood, Arm, Left [[id number]] Collected:  03/24/20 2024    Lab Status:  Final result Specimen:  Blood from Arm, Left Updated:  03/29/20 2045     Blood Culture No growth at 5 days    Respiratory Panel, PCR - Swab, Nasopharynx [[id number]]  (Normal) Collected:  03/24/20 1801    Lab Status:  Final result Specimen:  Swab from Nasopharynx Updated:  03/24/20 1949     ADENOVIRUS, PCR Not Detected     Coronavirus 229E Not Detected     Coronavirus HKU1 Not Detected     Coronavirus NL63 Not Detected     Coronavirus OC43 Not Detected     Human Metapneumovirus Not Detected     Human Rhinovirus/Enterovirus Not Detected     Influenza B PCR Not Detected     Parainfluenza Virus 1 Not Detected     Parainfluenza Virus 2 Not Detected     Parainfluenza Virus 3 Not Detected     Parainfluenza Virus 4 Not Detected     Bordetella pertussis pcr Not Detected     Influenza A H1 2009 PCR Not Detected     Chlamydophila pneumoniae PCR Not Detected     Mycoplasma pneumo by PCR Not Detected     Influenza A PCR Not Detected     Influenza A H3 Not Detected     Influenza A H1 Not Detected     RSV, PCR Not Detected    Narrative:       The coronavirus on the RVP is NOT COVID-19 and is NOT indicative of infection with COVID-19.     CORONAVIRUS-19(COVID-19),RT-PCR,[facility] - Swab, Nasopharynx [[id number]]  (Abnormal) Collected:   03/24/20 1801    Lab Status:  Final result Specimen:  Swab from Nasopharynx Updated:  03/26/20 1202     Reference Lab Report --     COVID19 Detected           IMAGING & OTHER STUDIES    Imaging Results (Last 72 Hours)     ** No results found for the last 72 hours. **                ASSESSMENT/PLAN:   SARS Co-V2/COVID 19 Lower respiratory tract infection  - Started on Hydroxychloroquine plus Zmax given clinical deterioration despite supportive care. However Plaquenil stopped due to blurry vision. Monitor LDH, CRP, Ferritin.   -Now with supportive care improvement noted  -Also noted Improvement in inflammatory markers. Continue to follow Ferritin/LDH/CRP.  -BNP within limits.   -Continue to improve clinically.  Now being monitored without antibiotics  -Infectious disease also following    Acute hypoxic respiratory failure  -Currently on 2 L nasal cannula.  Wean supplemental oxygen as appropriate.  Evaluate need for supplemental oxygen at discharge    Asthma acute exacerbation  - Continue Ventolin and Symbicort. Avoiding systemic steroids.  Patient was using Breo and xopenex at home    Moderate Obesity-BMI 38    DVT ppy Lovenox.     Increase activity.    No objection to discharge from pulmonary standpoint.  Will recommend 6-minute walk prior to discharge to evaluate need for supplemental oxygen.  Will recommend outpatient follow-up in pulmonary clinic for ongoing asthma management and possible sleep apnea evaluation

## 2020-04-01 NOTE — PLAN OF CARE
Up to shower with assist tolerated well on room air. Sitting up in the chair on room air without SOA, occ infrequent dry cough. Good appetite.

## 2020-04-01 NOTE — CONSULTS
Nutrition Services    Patient Name:  Renetta Ortiz  YOB: 1980  MRN: [id number]  Admit Date:  3/24/2020    Progress note:    LOS review (8 days): BMI 39.17. No weight loss trend. Skin intact. Eating % of meals. Pt is stooling.    Will follow prn or rescreen at next LOS.     Electronically signed by:  Gely So RD  04/01/20 11:50

## 2020-04-01 NOTE — PROGRESS NOTES
[facility] Medicine Services Daily Progress Note      Hospitalist Team  LOS 6 days      Patient Care Team:  Wilver Jovel MD as PCP - General (Family Medicine)    Patient Location: 4101/1      Subjective   Subjective     Chief Complaint / Subjective  Chief Complaint   Patient presents with   • Fever         Brief Synopsis of Hospital Course/HPI    Ms. Ortiz is a 40 y.o. with a past medical history of asthma and fibromyalgia who presents to [facility] 3/24 complaining of cough, fever, congestion x 4 days.  The patient was seen in the ED last night for vomiting and was discharged home with phenergan suppositories and Bentyl.  She states the Bentyl helped with abdominal cramping, but she was unable to figure out how to use the phenergan.  She states she has had fevers between  F at home.  She was attempting to take Tylenol and Advil, but was unable to keep it down.  Today, she has been dry heaving all day, bringing her to the ED.  She denies ill contacts or recent travel.  She states she does have a high risk daughter and they were recently at [facility] last week.  She reports her daughter has spinal muscular dystrophy.  She asked about calling them to let them know she is a potential rule out given all of the people she was exposed to there.  She is a .       In the ED, the patient's labs included the following: Na 143, K 3.6, BUN 6, Cr 0.60, glucose 85, WBC 3.3, hgb 13.3, plts 184.  .  CRP 4.48.  Respiratory panel negative.  CXR negative.  CT sinus negative.  CT abd/pelvis: bilateral lower lobe groundglass opacities seen with viral pneumonia.  Covid pending.  The patient was given Zofran, 1 L NS bolus and started on Rocephin in the ED.  She was admitted for further evaluation and management.          Confirmed covid 19  Patient states symptomatically no better.  Still on 6 L.  Not on any home oxygen.  Non-smoker.    On 2  "L oxygen now.  Feels breathing a little better.    Feels better.  On room air.  Incessant dry cough.    Review of Systems   Constitution: Negative.   HENT: Negative.    Eyes: Negative.    Cardiovascular: Negative.    Respiratory: Positive for shortness of breath.    Endocrine: Negative.    Hematologic/Lymphatic: Negative.    Skin: Negative.    Musculoskeletal: Negative.    Gastrointestinal: Negative.    Genitourinary: Negative.    Neurological: Negative.    Psychiatric/Behavioral: Negative.    Allergic/Immunologic: Negative.    All other systems reviewed and are negative.  No change      Objective   Objective      Vital Signs  Temp:  [97.5 °F (36.4 °C)-98.4 °F (36.9 °C)] 97.5 °F (36.4 °C)  Heart Rate:  [] 100  Resp:  [15-24] 16  BP: (100-122)/(68-82) 100/68  Oxygen Therapy  SpO2: 93 %  Pulse Oximetry Type: Continuous  Device (Oxygen Therapy): room air  Flow (L/min): 2  Oxygen Concentration (%): 44  Flowsheet Rows      First Filed Value   Admission Height  162.6 cm (64\") Documented at 03/24/2020 1717   Admission Weight  104 kg (228 lb 13.4 oz) Documented at 03/24/2020 1717        Intake & Output (last 3 days)       03/29 0701 - 03/30 0700 03/30 0701 - 03/31 0700 03/31 0701 - 04/01 0700 04/01 0701 - 04/02 0700    P.O. 1680 2400 2360     I.V. (mL/kg)  616 (5.9)      Total Intake(mL/kg) 1680 (16.3) 3016 (29) 2360 (22.9)     Urine (mL/kg/hr) 2600 (1.1)       Stool 0       Total Output 2600       Net -920 +3016 +2360             Urine Unmeasured Occurrence  8 x 5 x     Stool Unmeasured Occurrence 1 x 6 x          Lines, Drains & Airways    Active LDAs     Name:   Placement date:   Placement time:   Site:   Days:    Peripheral IV 03/24/20 1800 Left Hand   03/24/20    1800    Hand   less than 1                  Physical Exam:    Physical Exam   Constitutional: She is oriented to person, place, and time. She appears well-developed. No distress.   Morbidly obese   HENT:   Head: Normocephalic and atraumatic.   Right Ear: " External ear normal.   Left Ear: External ear normal.   Nose: Nose normal.   Mouth/Throat: Oropharynx is clear and moist. No oropharyngeal exudate.   Eyes: Pupils are equal, round, and reactive to light. Conjunctivae and EOM are normal. Right eye exhibits no discharge. Left eye exhibits no discharge. No scleral icterus.   Neck: Normal range of motion. No JVD present. No tracheal deviation present. No thyromegaly present.   Cardiovascular: Normal rate, regular rhythm, normal heart sounds and intact distal pulses. Exam reveals no gallop and no friction rub.   No murmur heard.  Pulmonary/Chest: Effort normal and breath sounds normal. No stridor. No respiratory distress. She has no wheezes. She has no rales. She exhibits no tenderness.   Diminished air entry bilaterally   Abdominal: Soft. Bowel sounds are normal. She exhibits no distension and no mass. There is no tenderness. There is no rebound and no guarding. No hernia.   Musculoskeletal: Normal range of motion. She exhibits no edema, tenderness or deformity.   Lymphadenopathy:     She has no cervical adenopathy.   Neurological: She is alert and oriented to person, place, and time. No cranial nerve deficit or sensory deficit. She exhibits normal muscle tone. Coordination normal.   Skin: Skin is warm and dry. No rash noted. She is not diaphoretic. No erythema.   Psychiatric: She has a normal mood and affect. Her behavior is normal.   Nursing note and vitals reviewed.  No change in examination from March 31          Procedures:              Results Review:     I reviewed the patient's new clinical results.      Lab Results (last 24 hours)     Procedure Component Value Units Date/Time    Lactate Dehydrogenase [[id number]]  (Abnormal) Collected:  04/01/20 0350    Specimen:  Blood Updated:  04/01/20 0458      U/L      Comment: Specimen hemolyzed.  Results may be affected.       Ferritin [[id number]]  (Abnormal) Collected:  04/01/20 0350    Specimen:  Blood Updated:   04/01/20 0458     Ferritin 1,030.00 ng/mL     Narrative:       Results may be falsely decreased if patient taking Biotin.      Troponin [[id number]]  (Normal) Collected:  04/01/20 0350    Specimen:  Blood Updated:  04/01/20 0458     Troponin T <0.010 ng/mL     Narrative:       Troponin T Reference Range:  <= 0.03 ng/mL-   Negative for AMI  >0.03 ng/mL-     Abnormal for myocardial necrosis.  Clinicians would have to utilize clinical acumen, EKG, Troponin and serial changes to determine if it is an Acute Myocardial Infarction or myocardial injury due to an underlying chronic condition.       Results may be falsely decreased if patient taking Biotin.      C-reactive Protein [[id number]]  (Abnormal) Collected:  04/01/20 0350    Specimen:  Blood Updated:  04/01/20 0451     C-Reactive Protein 3.12 mg/dL     Comprehensive Metabolic Panel [[id number]]  (Abnormal) Collected:  04/01/20 0350    Specimen:  Blood Updated:  04/01/20 0451     Glucose 96 mg/dL      BUN 5 mg/dL      Creatinine 0.51 mg/dL      Sodium 138 mmol/L      Potassium 3.8 mmol/L      Chloride 100 mmol/L      CO2 25.0 mmol/L      Calcium 8.7 mg/dL      Total Protein 6.2 g/dL      Albumin 2.90 g/dL      ALT (SGPT) 141 U/L      AST (SGOT) 113 U/L      Alkaline Phosphatase 114 U/L      Total Bilirubin 0.2 mg/dL      eGFR Non African Amer 134 mL/min/1.73      Globulin 3.3 gm/dL      A/G Ratio 0.9 g/dL      BUN/Creatinine Ratio 9.8     Anion Gap 13.0 mmol/L     Narrative:       GFR Normal >60  Chronic Kidney Disease <60  Kidney Failure <15      CK [[id number]]  (Normal) Collected:  04/01/20 0350    Specimen:  Blood Updated:  04/01/20 0451     Creatine Kinase 28 U/L     D-dimer, Quantitative [[id number]]  (Normal) Collected:  04/01/20 0350    Specimen:  Blood Updated:  04/01/20 0433     D-Dimer, Quantitative 0.51 MCGFEU/mL     Narrative:       Reference Range  --------------------------------------------------------------------     < 0.50   Negative Predictive  Value  0.50-0.59   Indeterminate    >= 0.60   Probable VTE             A very low percentage of patients with DVT may yield D-Dimer results   below the cut-off of 0.50 MCGFEU/mL.  This is known to be more   prevalent in patients with distal DVT.             Results of this test should always be interpreted in conjunction with   the patient's medical history, clinical presentation and other   findings.  Clinical diagnosis should not be based on the result of   IN[place]OV[place] D-Dimer alone.    CBC & Differential [[id number]] Collected:  04/01/20 0350    Specimen:  Blood Updated:  04/01/20 0417    Narrative:       The following orders were created for panel order CBC & Differential.  Procedure                               Abnormality         Status                     ---------                               -----------         ------                     CBC Auto Differential[[id number]]        Abnormal            Final result                 Please view results for these tests on the individual orders.    CBC Auto Differential [[id number]]  (Abnormal) Collected:  04/01/20 0350    Specimen:  Blood Updated:  04/01/20 0417     WBC 5.40 10*3/mm3      RBC 4.04 10*6/mm3      Hemoglobin 12.0 g/dL      Hematocrit 35.7 %      MCV 88.3 fL      MCH 29.6 pg      MCHC 33.5 g/dL      RDW 14.2 %      RDW-SD 44.6 fl      MPV 8.3 fL      Platelets 334 10*3/mm3      Neutrophil % 69.5 %      Lymphocyte % 16.8 %      Monocyte % 11.3 %      Eosinophil % 2.1 %      Basophil % 0.3 %      Neutrophils, Absolute 3.80 10*3/mm3      Lymphocytes, Absolute 0.90 10*3/mm3      Monocytes, Absolute 0.60 10*3/mm3      Eosinophils, Absolute 0.10 10*3/mm3      Basophils, Absolute 0.00 10*3/mm3      nRBC 0.0 /100 WBC         No results found for: HGBA1C            Lab Results   Component Value Date    LIPASE 39 03/24/2020     No results found for: CHOL, CHLPL, TRIG, HDL, LDL, LDLDIRECT    No results found for: INTRAOP, PREDX, FINALDX, COMDX    Microbiology  Results (last 10 days)     Procedure Component Value - Date/Time    Legionella Antigen, Urine - Urine, Urine, Clean Catch [[id number]]  (Normal) Collected:  03/26/20 1539    Lab Status:  Final result Specimen:  Urine, Clean Catch Updated:  03/26/20 1607     LEGIONELLA ANTIGEN, URINE Negative    S. Pneumo Ag Urine or CSF - Urine, Urine, Clean Catch [[id number]]  (Normal) Collected:  03/26/20 1539    Lab Status:  Final result Specimen:  Urine, Clean Catch Updated:  03/26/20 1607     Strep Pneumo Ag Negative    Blood Culture - Blood, Arm, Right [[id number]] Collected:  03/24/20 2057    Lab Status:  Final result Specimen:  Blood from Arm, Right Updated:  03/29/20 2100     Blood Culture No growth at 5 days    Blood Culture - Blood, Arm, Left [[id number]] Collected:  03/24/20 2024    Lab Status:  Final result Specimen:  Blood from Arm, Left Updated:  03/29/20 2045     Blood Culture No growth at 5 days    Respiratory Panel, PCR - Swab, Nasopharynx [[id number]]  (Normal) Collected:  03/24/20 1801    Lab Status:  Final result Specimen:  Swab from Nasopharynx Updated:  03/24/20 1949     ADENOVIRUS, PCR Not Detected     Coronavirus 229E Not Detected     Coronavirus HKU1 Not Detected     Coronavirus NL63 Not Detected     Coronavirus OC43 Not Detected     Human Metapneumovirus Not Detected     Human Rhinovirus/Enterovirus Not Detected     Influenza B PCR Not Detected     Parainfluenza Virus 1 Not Detected     Parainfluenza Virus 2 Not Detected     Parainfluenza Virus 3 Not Detected     Parainfluenza Virus 4 Not Detected     Bordetella pertussis pcr Not Detected     Influenza A H1 2009 PCR Not Detected     Chlamydophila pneumoniae PCR Not Detected     Mycoplasma pneumo by PCR Not Detected     Influenza A PCR Not Detected     Influenza A H3 Not Detected     Influenza A H1 Not Detected     RSV, PCR Not Detected    Narrative:       The coronavirus on the RVP is NOT COVID-19 and is NOT indicative of infection with COVID-19.      CORONAVIRUS-19(COVID-19),RT-PCR,[facility] - Swab, Nasopharynx [[id number]]  (Abnormal) Collected:  03/24/20 1801    Lab Status:  Final result Specimen:  Swab from Nasopharynx Updated:  03/26/20 1202     Reference Lab Report --     COVID19 Detected          ECG/EMG Results (most recent)     Procedure Component Value Units Date/Time    ECG 12 Lead [[id number]] Collected:  03/27/20 0711     Updated:  03/27/20 0715    Narrative:       HEART RATE= 118  bpm  RR Interval= 508  ms  MO Interval= 133  ms  P Horizontal Axis= 23  deg  P Front Axis= 35  deg  QRSD Interval= 70  ms  QT Interval= 309  ms  QRS Axis= -13  deg  T Wave Axis= 1  deg  - BORDERLINE ECG -  Sinus tachycardia  Low voltage, precordial leads  Consider anterior infarct  When compared with ECG of 26-Mar-2020 16:54:30,  No significant change  Electronically Signed By:   Date and Time of Study: 2020-03-27 07:11:52    ECG 12 Lead [[id number]] Collected:  03/26/20 1654     Updated:  03/28/20 1035    Narrative:       HEART RATE= 109  bpm  RR Interval= 548  ms  MO Interval= 136  ms  P Horizontal Axis= 24  deg  P Front Axis= 30  deg  QRSD Interval= 72  ms  QT Interval= 316  ms  QRS Axis= -11  deg  T Wave Axis= 10  deg  - BORDERLINE ECG -  Sinus tachycardia  Low voltage, precordial leads  No previous ECG available for comparison  Baseline wander in V3 noted  Electronically Signed By: Kem Khan (ROSA) 28-Mar-2020 10:33:50  Date and Time of Study: 2020-03-26 16:54:30                    Ct Abdomen Pelvis Without Contrast    Result Date: 3/24/2020  1.Bilateral lower lobe rounded groundglass opacities can be seen with viral pneumonia. Correlation with viral panel recommended. 2.No acute abdominal or pelvic abnormality.    Electronically Signed By-Olu Anne On:3/24/2020 7:42 PM This report was finalized on [id number] by  Olu Anne, .    Ct Chest Without Contrast    Result Date: 3/26/2020  Patchy groundglass opacities throughout both lungs are consistent with the  patient's diagnosis of COVID 19. Groundglass opacities in the lung bases have increased compared to 03/24/2020 CT abdomen and pelvis.    Electronically Signed By-Jannet Morrow On:3/26/2020 12:12 PM This report was finalized on [id number] by  Jannet Morrow, .    Xr Chest 1 View    Result Date: 3/24/2020  No acute cardiopulmonary process.  Electronically Signed By-Olu Anne On:3/24/2020 6:37 PM This report was finalized on [id number] by  Olu Anne, Padmini    Ct Sinus Without Contrast    Result Date: 3/24/2020  IMPRESSION : No acute process  Electronically Signed By-Olu Anne On:3/24/2020 7:36 PM This report was finalized on [id number] by  Olu Anne .          Xrays, labs reviewed personally by physician.    Medication Review:   I have reviewed the patient's current medication list      Scheduled Meds    albuterol sulfate HFA 2 puff Inhalation Q4H - RT   budesonide-formoterol 2 puff Inhalation BID - RT   enoxaparin 40 mg Subcutaneous Q24H   guaifenesin-dextromethorphan 1 tablet Oral BID   montelukast 10 mg Oral Nightly   pantoprazole 40 mg Oral Daily   pregabalin 100 mg Oral Q12H   sodium chloride 10 mL Intravenous Q12H   sodium chloride 10 mL Intravenous Q12H       Meds Infusions    Pharmacy Consult     sodium chloride 30 mL/hr Last Rate: Stopped (03/28/20 0715)       Meds PRN  •  acetaminophen **OR** acetaminophen **OR** acetaminophen  •  aluminum-magnesium hydroxide-simethicone  •  benzonatate  •  bisacodyl  •  calcium carbonate  •  cetirizine  •  dicyclomine  •  docusate sodium  •  magnesium hydroxide  •  magnesium sulfate **OR** magnesium sulfate **OR** magnesium sulfate  •  melatonin  •  ondansetron **OR** ondansetron  •  Pharmacy Consult  •  potassium chloride  •  potassium chloride  •  prochlorperazine  •  promethazine  •  sodium chloride  •  sodium chloride    I personally reviewed patient's x-ray films and all other relevant data      Assessment/Plan   Assessment/Plan     Active Hospital  Problems    Diagnosis  POA   • **Pneumonia due to COVID-19 virus [U07.1, J12.89]  Yes     Priority: High   • Acute respiratory failure with hypoxia (CMS/HCC) [J96.01]  Yes     Priority: Medium   • Morbidly obese (CMS/HCC) [E66.01]  Yes   • Asthma [J45.909]  No      Resolved Hospital Problems   No resolved problems to display.       MEDICAL DECISION MAKING COMPLEXITY BY PROBLEM:     Bilateral viral pneumonia  Positive Covid 19 virus infection  - CXR negative  - CT sinus negative  - CT abd/pelvis: bilateral lower lobe groundglass opacities seen with viral pneumonia  - Covid pending  - RVP negative   -   - CRP 4.48  All biomarkers in favor of covid infection    Hypoxemia  Patient is saturating relatively well on 2 L.  Suspect symptoms are related to viral pneumonia  We will monito     GERD  - continue home omeprazole     Consult infectious disease on account of intolerance of hydroxychloroquine.  Continue supportive treatment in the meantime.  Prognosis is guarded due to comorbidities and poor baseline health  Patient is new to me    Improved but still sick    Add cough suppressant      VTE Prophylaxis -Lovenox  Mechanical Order History:      Ordered        03/24/20 2144  Place Sequential Compression Device  Once         03/24/20 2144  Maintain Sequential Compression Device  Continuous                 Pharmalogical Order History:     None            Code Status -   Code Status and Medical Interventions:   Ordered at: 03/24/20 2049     Code Status:    CPR     Medical Interventions (Level of Support Prior to Arrest):    Full       This patient has been examined wearing appropriate Personal Protective Equipment and discussed with RN      Discharge Planning    Patient met for viral pneumonia and was positive for normal coronavirus  .  Seen by pulmonary service due to persistent pneumonia and hypoxemia.  Started on Zithromax and hydroxychloroquine but had visual symptoms with blurry vision that has resolved after  holding/stopping hydroxychloroquine.  Her mother also is positive and on surgical unit and she is power of .    Destination      Coordination has not been started for this encounter.      Durable Medical Equipment      Coordination has not been started for this encounter.      Dialysis/Infusion      Coordination has not been started for this encounter.      Home Medical Care      Coordination has not been started for this encounter.      Therapy      Coordination has not been started for this encounter.      Community Resources      Coordination has not been started for this encounter.            Electronically signed by Trav Flores MD, 04/01/20, 15:46.  [facility] Hospitalist Team

## 2020-04-01 NOTE — PROGRESS NOTES
Infectious Diseases Progress Note      LOS: 6 days   Patient Care Team:  Wilver Jovel MD as PCP - General (Family Medicine)        Subjective     The patient remained afebrile.  She requested to be placed on 2 L of oxygen however her O2 sat on room air was okay.  She remained hemodynamically stable.  She continued to have dry cough    Review of Systems:   Review of Systems   Constitutional: Negative.    HENT: Negative.    Eyes: Negative.    Respiratory: Positive for cough and shortness of breath.    Cardiovascular: Negative.    Gastrointestinal: Negative.    Genitourinary: Negative.    Musculoskeletal: Negative.    Skin: Negative.    Neurological: Negative.    Hematological: Negative.    Psychiatric/Behavioral: Negative.         Objective     Vital Signs  Temp:  [97.8 °F (36.6 °C)-98.4 °F (36.9 °C)] 97.9 °F (36.6 °C)  Heart Rate:  [78-99] 91  Resp:  [15-24] 16  BP: (100-122)/(68-82) 100/68    Physical Exam:  Physical Exam   Constitutional: She is oriented to person, place, and time. She appears well-developed and well-nourished.   HENT:   Head: Normocephalic and atraumatic.   Eyes: Pupils are equal, round, and reactive to light. EOM are normal.   Neck: Normal range of motion. Neck supple.   Cardiovascular: Normal rate, regular rhythm and normal heart sounds.   Pulmonary/Chest: Effort normal and breath sounds normal. No respiratory distress. She has no wheezes. She has no rales.   Abdominal: Soft. Bowel sounds are normal. She exhibits no distension and no mass. There is no tenderness. There is no rebound and no guarding.   Musculoskeletal: Normal range of motion. She exhibits no edema or deformity.   Neurological: She is alert and oriented to person, place, and time. No cranial nerve deficit.   Skin: Skin is warm. No rash noted. No erythema.   Psychiatric: She has a normal mood and affect.   Nursing note and vitals reviewed.       Results Review:    I have reviewed all clinical data, test, lab, and imaging  results.     Radiology  No Radiology Exams Resulted Within Past 24 Hours    Cardiology    Laboratory    Results from last 7 days   Lab Units 04/01/20 0350 03/31/20 0234 03/30/20 0605 03/29/20 0420 03/28/20 0343 03/27/20 0404 03/26/20  0232   WBC 10*3/mm3 5.40 4.80 5.00 8.60 3.00* 4.50 3.20*   HEMOGLOBIN g/dL 12.0 11.4* 11.6* 12.0 13.2 12.7 12.7   HEMATOCRIT % 35.7 33.8* 34.1 34.8 38.8 37.6 36.9   PLATELETS 10*3/mm3 334 300 279 259 218 187 190     Results from last 7 days   Lab Units 04/01/20 0350 03/31/20 0234 03/30/20 0605 03/29/20 0420 03/28/20 0343 03/27/20 2001 03/27/20 0404 03/26/20  0232   SODIUM mmol/L 138 143 141 142 143  --  140 143   POTASSIUM mmol/L 3.8 3.8 3.6 4.0 4.3 3.8 3.3* 3.6   CHLORIDE mmol/L 100 103 100 104 105  --  103 107   CO2 mmol/L 25.0 28.0 28.0 27.0 26.0  --  26.0 26.0   BUN mg/dL 5* 6 6 6 5*  --  4* 4*   CREATININE mg/dL 0.51* 0.52* 0.60 0.60 0.58  --  0.54* 0.64   GLUCOSE mg/dL 96 95 92 111* 132*  --  119* 108*   ALBUMIN g/dL 2.90* 3.10*  --   --  3.30*  --  3.10* 3.40*   BILIRUBIN mg/dL 0.2 0.2  --   --  0.2  --  0.2 0.2   ALK PHOS U/L 114 85  --   --  82  --  75 79   AST (SGOT) U/L 113* 49*  --   --  66*  --  38* 42*   ALT (SGPT) U/L 141* 86*  --   --  65*  --  40* 39*   CALCIUM mg/dL 8.7 8.8 8.7 8.5* 8.7  --  8.0* 7.8*     Results from last 7 days   Lab Units 04/01/20  0350   CK TOTAL U/L 28             Microbiology   Microbiology Results (last 10 days)     Procedure Component Value - Date/Time    Legionella Antigen, Urine - Urine, Urine, Clean Catch [[id number]]  (Normal) Collected:  03/26/20 1539    Lab Status:  Final result Specimen:  Urine, Clean Catch Updated:  03/26/20 1607     LEGIONELLA ANTIGEN, URINE Negative    S. Pneumo Ag Urine or CSF - Urine, Urine, Clean Catch [[id number]]  (Normal) Collected:  03/26/20 1539    Lab Status:  Final result Specimen:  Urine, Clean Catch Updated:  03/26/20 1607     Strep Pneumo Ag Negative    Blood Culture - Blood, Arm, Right  [[id number]] Collected:  03/24/20 2057    Lab Status:  Final result Specimen:  Blood from Arm, Right Updated:  03/29/20 2100     Blood Culture No growth at 5 days    Blood Culture - Blood, Arm, Left [[id number]] Collected:  03/24/20 2024    Lab Status:  Final result Specimen:  Blood from Arm, Left Updated:  03/29/20 2045     Blood Culture No growth at 5 days    Respiratory Panel, PCR - Swab, Nasopharynx [[id number]]  (Normal) Collected:  03/24/20 1801    Lab Status:  Final result Specimen:  Swab from Nasopharynx Updated:  03/24/20 1949     ADENOVIRUS, PCR Not Detected     Coronavirus 229E Not Detected     Coronavirus HKU1 Not Detected     Coronavirus NL63 Not Detected     Coronavirus OC43 Not Detected     Human Metapneumovirus Not Detected     Human Rhinovirus/Enterovirus Not Detected     Influenza B PCR Not Detected     Parainfluenza Virus 1 Not Detected     Parainfluenza Virus 2 Not Detected     Parainfluenza Virus 3 Not Detected     Parainfluenza Virus 4 Not Detected     Bordetella pertussis pcr Not Detected     Influenza A H1 2009 PCR Not Detected     Chlamydophila pneumoniae PCR Not Detected     Mycoplasma pneumo by PCR Not Detected     Influenza A PCR Not Detected     Influenza A H3 Not Detected     Influenza A H1 Not Detected     RSV, PCR Not Detected    Narrative:       The coronavirus on the RVP is NOT COVID-19 and is NOT indicative of infection with COVID-19.     CORONAVIRUS-19(COVID-19),RT-PCR,[facility] - Swab, Nasopharynx [[id number]]  (Abnormal) Collected:  03/24/20 1801    Lab Status:  Final result Specimen:  Swab from Nasopharynx Updated:  03/26/20 1202     Reference Lab Report --     COVID19 Detected          Medication Review:       Schedule Meds    albuterol sulfate HFA 2 puff Inhalation Q4H - RT   budesonide-formoterol 2 puff Inhalation BID - RT   enoxaparin 40 mg Subcutaneous Q24H   guaifenesin-dextromethorphan 1 tablet Oral BID   montelukast 10 mg Oral Nightly   pantoprazole 40 mg Oral Daily      pregabalin 100 mg Oral Q12H   sodium chloride 10 mL Intravenous Q12H   sodium chloride 10 mL Intravenous Q12H       Infusion Meds    Pharmacy Consult     sodium chloride 30 mL/hr Last Rate: Stopped (03/28/20 0715)       PRN Meds  •  acetaminophen **OR** acetaminophen **OR** acetaminophen  •  aluminum-magnesium hydroxide-simethicone  •  benzonatate  •  bisacodyl  •  calcium carbonate  •  docusate sodium  •  magnesium hydroxide  •  magnesium sulfate **OR** magnesium sulfate **OR** magnesium sulfate  •  melatonin  •  ondansetron **OR** ondansetron  •  Pharmacy Consult  •  potassium chloride  •  potassium chloride  •  prochlorperazine  •  promethazine  •  sodium chloride  •  sodium chloride        Assessment/Plan       Antimicrobial Therapy   1.       Day  2.      Day  3.      Day  4.      Day  5.      Day    Assessment     COVID-19 / SARS Cov2 infection.       Hypoxia secondary to above.  Improved currently on room air     History of asthma     Plan     Okay to discharge patient from infectious disease point however the patient prefers to stay another day in the hospital  No need for antimicrobial therapy at this point     Appropriate personal protective equipments were placed on prior entering the room.  That is including N 95 mask, full body gone, facial shield and gloves.        Suzy Chavarria MD  04/01/20  14:03      Note is dictated utilizing voice recognition software/Dragon

## 2020-04-02 VITALS
OXYGEN SATURATION: 92 % | WEIGHT: 228.18 LBS | BODY MASS INDEX: 38.96 KG/M2 | DIASTOLIC BLOOD PRESSURE: 77 MMHG | RESPIRATION RATE: 18 BRPM | HEIGHT: 64 IN | TEMPERATURE: 98.4 F | SYSTOLIC BLOOD PRESSURE: 112 MMHG | HEART RATE: 90 BPM

## 2020-04-02 LAB
ANION GAP SERPL CALCULATED.3IONS-SCNC: 13 MMOL/L (ref 5–15)
BASOPHILS # BLD AUTO: 0 10*3/MM3 (ref 0–0.2)
BASOPHILS NFR BLD AUTO: 0.5 % (ref 0–1.5)
BUN BLD-MCNC: 11 MG/DL (ref 6–20)
BUN/CREAT SERPL: 19.6 (ref 7–25)
CALCIUM SPEC-SCNC: 9.1 MG/DL (ref 8.6–10.5)
CHLORIDE SERPL-SCNC: 106 MMOL/L (ref 98–107)
CO2 SERPL-SCNC: 23 MMOL/L (ref 22–29)
CREAT BLD-MCNC: 0.56 MG/DL (ref 0.57–1)
DEPRECATED RDW RBC AUTO: 40.7 FL (ref 37–54)
EOSINOPHIL # BLD AUTO: 0.1 10*3/MM3 (ref 0–0.4)
EOSINOPHIL NFR BLD AUTO: 2.1 % (ref 0.3–6.2)
ERYTHROCYTE [DISTWIDTH] IN BLOOD BY AUTOMATED COUNT: 13.5 % (ref 12.3–15.4)
GFR SERPL CREATININE-BSD FRML MDRD: 121 ML/MIN/1.73
GLUCOSE BLD-MCNC: 98 MG/DL (ref 65–99)
HCT VFR BLD AUTO: 35.2 % (ref 34–46.6)
HGB BLD-MCNC: 12.1 G/DL (ref 12–15.9)
LYMPHOCYTES # BLD AUTO: 1 10*3/MM3 (ref 0.7–3.1)
LYMPHOCYTES NFR BLD AUTO: 14.1 % (ref 19.6–45.3)
MCH RBC QN AUTO: 29.5 PG (ref 26.6–33)
MCHC RBC AUTO-ENTMCNC: 34.3 G/DL (ref 31.5–35.7)
MCV RBC AUTO: 86 FL (ref 79–97)
MONOCYTES # BLD AUTO: 0.8 10*3/MM3 (ref 0.1–0.9)
MONOCYTES NFR BLD AUTO: 12.1 % (ref 5–12)
NEUTROPHILS # BLD AUTO: 4.9 10*3/MM3 (ref 1.7–7)
NEUTROPHILS NFR BLD AUTO: 71.2 % (ref 42.7–76)
NRBC BLD AUTO-RTO: 0.1 /100 WBC (ref 0–0.2)
PLATELET # BLD AUTO: 390 10*3/MM3 (ref 140–450)
PMV BLD AUTO: 8.8 FL (ref 6–12)
POTASSIUM BLD-SCNC: 4.5 MMOL/L (ref 3.5–5.2)
RBC # BLD AUTO: 4.09 10*6/MM3 (ref 3.77–5.28)
SODIUM BLD-SCNC: 142 MMOL/L (ref 136–145)
WBC NRBC COR # BLD: 6.9 10*3/MM3 (ref 3.4–10.8)

## 2020-04-02 PROCEDURE — 99239 HOSP IP/OBS DSCHRG MGMT >30: CPT | Performed by: INTERNAL MEDICINE

## 2020-04-02 PROCEDURE — 25010000002 ENOXAPARIN PER 10 MG: Performed by: INTERNAL MEDICINE

## 2020-04-02 PROCEDURE — 80048 BASIC METABOLIC PNL TOTAL CA: CPT | Performed by: INTERNAL MEDICINE

## 2020-04-02 PROCEDURE — 85025 COMPLETE CBC W/AUTO DIFF WBC: CPT | Performed by: INTERNAL MEDICINE

## 2020-04-02 PROCEDURE — 94799 UNLISTED PULMONARY SVC/PX: CPT

## 2020-04-02 RX ORDER — PROMETHAZINE HYDROCHLORIDE 25 MG/1
12.5 SUPPOSITORY RECTAL EVERY 6 HOURS PRN
Qty: 10 SUPPOSITORY | Refills: 0
Start: 2020-04-02

## 2020-04-02 RX ORDER — BENZONATATE 200 MG/1
200 CAPSULE ORAL 3 TIMES DAILY PRN
Qty: 45 CAPSULE | Refills: 0 | Status: SHIPPED | OUTPATIENT
Start: 2020-04-02 | End: 2020-04-17

## 2020-04-02 RX ORDER — GUAIFENESIN AND CODEINE PHOSPHATE 100; 10 MG/5ML; MG/5ML
5 SOLUTION ORAL EVERY 4 HOURS PRN
Qty: 120 ML | Refills: 0 | Status: SHIPPED | OUTPATIENT
Start: 2020-04-02

## 2020-04-02 RX ORDER — ALBUTEROL SULFATE 90 UG/1
2 AEROSOL, METERED RESPIRATORY (INHALATION) EVERY 4 HOURS PRN
Qty: 1 INHALER | Refills: 0 | Status: SHIPPED | OUTPATIENT
Start: 2020-04-02 | End: 2020-05-02

## 2020-04-02 RX ADMIN — Medication 10 ML: at 08:37

## 2020-04-02 RX ADMIN — BUDESONIDE AND FORMOTEROL FUMARATE DIHYDRATE 2 PUFF: 160; 4.5 AEROSOL RESPIRATORY (INHALATION) at 06:57

## 2020-04-02 RX ADMIN — ALBUTEROL SULFATE 2 PUFF: 90 AEROSOL, METERED RESPIRATORY (INHALATION) at 06:57

## 2020-04-02 RX ADMIN — ACETAMINOPHEN 650 MG: 325 TABLET ORAL at 08:49

## 2020-04-02 RX ADMIN — ENOXAPARIN SODIUM 40 MG: 40 INJECTION SUBCUTANEOUS at 14:26

## 2020-04-02 RX ADMIN — ALBUTEROL SULFATE 2 PUFF: 90 AEROSOL, METERED RESPIRATORY (INHALATION) at 04:11

## 2020-04-02 RX ADMIN — BENZONATATE 200 MG: 100 CAPSULE ORAL at 08:49

## 2020-04-02 RX ADMIN — CALCIUM CARBONATE (ANTACID) CHEW TAB 500 MG 2 TABLET: 500 CHEW TAB at 08:35

## 2020-04-02 RX ADMIN — ALBUTEROL SULFATE 2 PUFF: 90 AEROSOL, METERED RESPIRATORY (INHALATION) at 11:26

## 2020-04-02 RX ADMIN — PREGABALIN 100 MG: 100 CAPSULE ORAL at 08:35

## 2020-04-02 RX ADMIN — PANTOPRAZOLE SODIUM 40 MG: 40 TABLET, DELAYED RELEASE ORAL at 08:35

## 2020-04-02 NOTE — PROGRESS NOTES
Infectious Diseases Progress Note      LOS: 7 days   Patient Care Team:  Wilver Jovel MD as PCP - General (Family Medicine)        Subjective     The patient remained afebrile.  She requested to be placed on 2 L of oxygen however her O2 sat on room air was okay.  She remained hemodynamically stable.  She continued to have dry cough    Review of Systems:   Review of Systems   Constitutional: Negative.    HENT: Negative.    Eyes: Negative.    Respiratory: Positive for cough and shortness of breath.    Cardiovascular: Negative.    Gastrointestinal: Negative.    Genitourinary: Negative.    Musculoskeletal: Negative.    Skin: Negative.    Neurological: Negative.    Hematological: Negative.    Psychiatric/Behavioral: Negative.         Objective     Vital Signs  Temp:  [97.5 °F (36.4 °C)-98.8 °F (37.1 °C)] 98.4 °F (36.9 °C)  Heart Rate:  [] 90  Resp:  [16-20] 18  BP: (101-116)/(66-81) 112/77    Physical Exam:  Physical Exam   Constitutional: She is oriented to person, place, and time. She appears well-developed and well-nourished.   HENT:   Head: Normocephalic and atraumatic.   Eyes: Pupils are equal, round, and reactive to light. EOM are normal.   Neck: Normal range of motion. Neck supple.   Cardiovascular: Normal rate, regular rhythm and normal heart sounds.   Pulmonary/Chest: Effort normal and breath sounds normal. No respiratory distress. She has no wheezes. She has no rales.   Abdominal: Soft. Bowel sounds are normal. She exhibits no distension and no mass. There is no tenderness. There is no rebound and no guarding.   Musculoskeletal: Normal range of motion. She exhibits no edema or deformity.   Neurological: She is alert and oriented to person, place, and time. No cranial nerve deficit.   Skin: Skin is warm. No rash noted. No erythema.   Psychiatric: She has a normal mood and affect.   Nursing note and vitals reviewed.    (ROS and physical exam performed by Dr. Chavarria)     Results Review:    I have  reviewed all clinical data, test, lab, and imaging results.     Radiology  No Radiology Exams Resulted Within Past 24 Hours    Cardiology    Laboratory    Results from last 7 days   Lab Units 04/02/20 0321 04/01/20 0350 03/31/20 0234 03/30/20 0605 03/29/20 0420 03/28/20 0343 03/27/20  0404   WBC 10*3/mm3 6.90 5.40 4.80 5.00 8.60 3.00* 4.50   HEMOGLOBIN g/dL 12.1 12.0 11.4* 11.6* 12.0 13.2 12.7   HEMATOCRIT % 35.2 35.7 33.8* 34.1 34.8 38.8 37.6   PLATELETS 10*3/mm3 390 334 300 279 259 218 187     Results from last 7 days   Lab Units 04/02/20 0321 04/01/20 0350 03/31/20 0234 03/30/20 0605 03/29/20 0420 03/28/20 0343 03/27/20 2001 03/27/20  0404   SODIUM mmol/L 142 138 143 141 142 143  --  140   POTASSIUM mmol/L 4.5 3.8 3.8 3.6 4.0 4.3 3.8 3.3*   CHLORIDE mmol/L 106 100 103 100 104 105  --  103   CO2 mmol/L 23.0 25.0 28.0 28.0 27.0 26.0  --  26.0   BUN mg/dL 11 5* 6 6 6 5*  --  4*   CREATININE mg/dL 0.56* 0.51* 0.52* 0.60 0.60 0.58  --  0.54*   GLUCOSE mg/dL 98 96 95 92 111* 132*  --  119*   ALBUMIN g/dL  --  2.90* 3.10*  --   --  3.30*  --  3.10*   BILIRUBIN mg/dL  --  0.2 0.2  --   --  0.2  --  0.2   ALK PHOS U/L  --  114 85  --   --  82  --  75   AST (SGOT) U/L  --  113* 49*  --   --  66*  --  38*   ALT (SGPT) U/L  --  141* 86*  --   --  65*  --  40*   CALCIUM mg/dL 9.1 8.7 8.8 8.7 8.5* 8.7  --  8.0*     Results from last 7 days   Lab Units 04/01/20  0350   CK TOTAL U/L 28             Microbiology   Microbiology Results (last 10 days)     Procedure Component Value - Date/Time    Legionella Antigen, Urine - Urine, Urine, Clean Catch [[id number]]  (Normal) Collected:  03/26/20 1539    Lab Status:  Final result Specimen:  Urine, Clean Catch Updated:  03/26/20 1607     LEGIONELLA ANTIGEN, URINE Negative    S. Pneumo Ag Urine or CSF - Urine, Urine, Clean Catch [[id number]]  (Normal) Collected:  03/26/20 1539    Lab Status:  Final result Specimen:  Urine, Clean Catch Updated:  03/26/20 1607     Strep Pneumo Ag  Negative    Blood Culture - Blood, Arm, Right [[id number]] Collected:  03/24/20 2057    Lab Status:  Final result Specimen:  Blood from Arm, Right Updated:  03/29/20 2100     Blood Culture No growth at 5 days    Blood Culture - Blood, Arm, Left [[id number]] Collected:  03/24/20 2024    Lab Status:  Final result Specimen:  Blood from Arm, Left Updated:  03/29/20 2045     Blood Culture No growth at 5 days    Respiratory Panel, PCR - Swab, Nasopharynx [[id number]]  (Normal) Collected:  03/24/20 1801    Lab Status:  Final result Specimen:  Swab from Nasopharynx Updated:  03/24/20 1949     ADENOVIRUS, PCR Not Detected     Coronavirus 229E Not Detected     Coronavirus HKU1 Not Detected     Coronavirus NL63 Not Detected     Coronavirus OC43 Not Detected     Human Metapneumovirus Not Detected     Human Rhinovirus/Enterovirus Not Detected     Influenza B PCR Not Detected     Parainfluenza Virus 1 Not Detected     Parainfluenza Virus 2 Not Detected     Parainfluenza Virus 3 Not Detected     Parainfluenza Virus 4 Not Detected     Bordetella pertussis pcr Not Detected     Influenza A H1 2009 PCR Not Detected     Chlamydophila pneumoniae PCR Not Detected     Mycoplasma pneumo by PCR Not Detected     Influenza A PCR Not Detected     Influenza A H3 Not Detected     Influenza A H1 Not Detected     RSV, PCR Not Detected    Narrative:       The coronavirus on the RVP is NOT COVID-19 and is NOT indicative of infection with COVID-19.     CORONAVIRUS-19(COVID-19),RT-PCR,[facility] - Swab, Nasopharynx [[id number]]  (Abnormal) Collected:  03/24/20 1801    Lab Status:  Final result Specimen:  Swab from Nasopharynx Updated:  03/26/20 1202     Reference Lab Report --     COVID19 Detected          Medication Review:       Schedule Meds    albuterol sulfate HFA 2 puff Inhalation Q4H - RT   budesonide-formoterol 2 puff Inhalation BID - RT   enoxaparin 40 mg Subcutaneous Q24H   montelukast 10 mg Oral Nightly   pantoprazole 40 mg Oral Daily      pregabalin 100 mg Oral Q12H   sodium chloride 10 mL Intravenous Q12H   sodium chloride 10 mL Intravenous Q12H       Infusion Meds       PRN Meds  •  acetaminophen **OR** acetaminophen **OR** acetaminophen  •  aluminum-magnesium hydroxide-simethicone  •  benzonatate  •  bisacodyl  •  calcium carbonate  •  docusate sodium  •  guaiFENesin-codeine  •  magnesium hydroxide  •  magnesium sulfate **OR** magnesium sulfate **OR** magnesium sulfate  •  melatonin  •  ondansetron **OR** ondansetron  •  potassium chloride  •  potassium chloride  •  prochlorperazine  •  promethazine  •  sodium chloride  •  sodium chloride        Assessment/Plan       Antimicrobial Therapy   1.       Day  2.      Day  3.      Day  4.      Day  5.      Day    Assessment     COVID-19 / SARS Cov2 infection.       Hypoxia secondary to above.  Improved currently on 2 L of oxygen     History of asthma     Plan     No need for antimicrobial therapy at this point  Continue supportive care  Okay to discharge from Infectious Disease standpoint     Appropriate personal protective equipments were placed on prior entering the room.  That is including N 95 mask, full body gone, facial shield and gloves.    This note is scribed for Dr. Chavarria  The patient was seen and examined by Dr. Chavarria  The patient can be discharged home today  The patient needs to stay self quarantine.  Based on CDC recommendation at least 3 days from being completely asymptomatic.  Personally I recommend to stay home for couple more weeks    Rahel Whitfield, APRN  04/02/20  14:56      Note is dictated utilizing voice recognition software/Dragon

## 2020-04-02 NOTE — PLAN OF CARE
Pt has been stable on room air until she went to sleep. SATs dropped under 90% so put 1 L back on during night until she is awake and alert in AM. Will continue to monitor.

## 2020-04-02 NOTE — PROGRESS NOTES
KPA/PULM/CC PROGRESS NOTE       SUBJECTIVE    40 y.o. with a past medical history of asthma and fibromyalgia admitted to [facility] 3/24 complaining of cough, fever, chest pain, SOA, vomiting x 4 days.  The patient was seen in the ED last night for vomiting and was discharged home with phenergan suppositories and Bentyl.  She states the Bentyl helped with abdominal cramping, but she was unable to figure out how to use the phenergan.  She states she has had fevers between  F at home.  She states she does have a high risk daughter and they were recently at [facility].  She reports her daughter has spinal muscular dystrophy. She was admitted and tested positive for SARS CoV-2 ( COVID 19)      CT abd/pelvis: bilateral lower lobe groundglass opacities seen with viral pneumonia.     3/28 SOA better.   3/29 + DELUCA with minimal activity   3/30: Remains with dry cough. SOA stable. O2 requirement improved.  3/31: Afebrile. SOA stable. Continues to stay lethargic and needs assistance in mobility. O2 requirement stable at 3L. Dry Cough+  4/1: Awake.  Currently on 2 L nasal cannula.  Complains of some cough with thick phlegm.  Positive shortness of breath with activity.  Denies any nausea or vomiting.  Typical bedtime is around 9 PM.  She gets up at 5:30 AM.  Positive snoring.  4/2: Awake.  Currently on 2 L nasal cannula.  Some desaturation at night.  Remains with some cough.  Some shortness of breath with activity.  No nausea or vomiting.  No fever or chills    OBJECTIVE    Vitals:    04/02/20 0657 04/02/20 0700 04/02/20 1126 04/02/20 1128   BP:       BP Location:       Patient Position:       Pulse: 96 110 90 91   Resp: 18 18 18 18   Temp:       TempSrc:       SpO2: 93%  92%    Weight:       Height:          Intake/Output last 3 shifts:  I/O last 3 completed shifts:  In: 1120 [P.O.:1120]  Out: -   Intake/Output this shift:  No intake/output data recorded.  No intake or output data in  the 24 hours ending 04/02/20 1149    General Appearance: Awake, no distress, appears stated age  Head:  Normocephalic, without obvious abnormality, atraumatic, Mallampati 3  Eyes: conjunctiva/corneas clear , EOMI  Neck:  Supple,  no adenopathy, no JVD or bruit      Lungs: Good air entry bilaterally, no crackles or wheezes  Chest wall:  No tenderness  Heart:  Regular rate and rhythm, S1 and S2 normal, no murmur, rub or gallop  Abdomen:  Soft, non-tender, bowel sounds active all four quadrants,  no masses, no hepatomegaly, no splenomegaly  Extremities:  Extremities normal, no cyanosis or edema  Pulses: 2+ and symmetric all extremities  Skin:  No rashes or lesions  Neurologic: Awake, 5 x 5 power in all extremities.  Cranial nerves II through XII grossly intact.      Scheduled Meds:    albuterol sulfate HFA 2 puff Inhalation Q4H - RT   budesonide-formoterol 2 puff Inhalation BID - RT   enoxaparin 40 mg Subcutaneous Q24H   montelukast 10 mg Oral Nightly   pantoprazole 40 mg Oral Daily   pregabalin 100 mg Oral Q12H   sodium chloride 10 mL Intravenous Q12H   sodium chloride 10 mL Intravenous Q12H       Continuous Infusions:       PRN Meds:•  acetaminophen **OR** acetaminophen **OR** acetaminophen  •  aluminum-magnesium hydroxide-simethicone  •  benzonatate  •  bisacodyl  •  calcium carbonate  •  docusate sodium  •  guaiFENesin-codeine  •  magnesium hydroxide  •  magnesium sulfate **OR** magnesium sulfate **OR** magnesium sulfate  •  melatonin  •  ondansetron **OR** ondansetron  •  potassium chloride  •  potassium chloride  •  prochlorperazine  •  promethazine  •  sodium chloride  •  sodium chloride     LABS    Lab Results (last 24 hours)     Procedure Component Value Units Date/Time    Basic Metabolic Panel [[id number]]  (Abnormal) Collected:  04/02/20 0321    Specimen:  Blood Updated:  04/02/20 0454     Glucose 98 mg/dL      BUN 11 mg/dL      Creatinine 0.56 mg/dL      Sodium 142 mmol/L      Potassium 4.5 mmol/L       Chloride 106 mmol/L      CO2 23.0 mmol/L      Calcium 9.1 mg/dL      eGFR Non African Amer 121 mL/min/1.73      BUN/Creatinine Ratio 19.6     Anion Gap 13.0 mmol/L     Narrative:       GFR Normal >60  Chronic Kidney Disease <60  Kidney Failure <15      CBC & Differential [[id number]] Collected:  04/02/20 0321    Specimen:  Blood Updated:  04/02/20 0431    Narrative:       The following orders were created for panel order CBC & Differential.  Procedure                               Abnormality         Status                     ---------                               -----------         ------                     CBC Auto Differential[[id number]]        Abnormal            Final result                 Please view results for these tests on the individual orders.    CBC Auto Differential [[id number]]  (Abnormal) Collected:  04/02/20 0321    Specimen:  Blood Updated:  04/02/20 0431     WBC 6.90 10*3/mm3      RBC 4.09 10*6/mm3      Hemoglobin 12.1 g/dL      Hematocrit 35.2 %      MCV 86.0 fL      MCH 29.5 pg      MCHC 34.3 g/dL      RDW 13.5 %      RDW-SD 40.7 fl      MPV 8.8 fL      Platelets 390 10*3/mm3      Neutrophil % 71.2 %      Lymphocyte % 14.1 %      Monocyte % 12.1 %      Eosinophil % 2.1 %      Basophil % 0.5 %      Neutrophils, Absolute 4.90 10*3/mm3      Lymphocytes, Absolute 1.00 10*3/mm3      Monocytes, Absolute 0.80 10*3/mm3      Eosinophils, Absolute 0.10 10*3/mm3      Basophils, Absolute 0.00 10*3/mm3      nRBC 0.1 /100 WBC          Microbiology Results (last 10 days)     Procedure Component Value - Date/Time    Legionella Antigen, Urine - Urine, Urine, Clean Catch [[id number]]  (Normal) Collected:  03/26/20 1539    Lab Status:  Final result Specimen:  Urine, Clean Catch Updated:  03/26/20 1607     LEGIONELLA ANTIGEN, URINE Negative    S. Pneumo Ag Urine or CSF - Urine, Urine, Clean Catch [[id number]]  (Normal) Collected:  03/26/20 1539    Lab Status:  Final result Specimen:  Urine, Clean Catch Updated:   03/26/20 1607     Strep Pneumo Ag Negative    Blood Culture - Blood, Arm, Right [[id number]] Collected:  03/24/20 2057    Lab Status:  Final result Specimen:  Blood from Arm, Right Updated:  03/29/20 2100     Blood Culture No growth at 5 days    Blood Culture - Blood, Arm, Left [[id number]] Collected:  03/24/20 2024    Lab Status:  Final result Specimen:  Blood from Arm, Left Updated:  03/29/20 2045     Blood Culture No growth at 5 days    Respiratory Panel, PCR - Swab, Nasopharynx [[id number]]  (Normal) Collected:  03/24/20 1801    Lab Status:  Final result Specimen:  Swab from Nasopharynx Updated:  03/24/20 1949     ADENOVIRUS, PCR Not Detected     Coronavirus 229E Not Detected     Coronavirus HKU1 Not Detected     Coronavirus NL63 Not Detected     Coronavirus OC43 Not Detected     Human Metapneumovirus Not Detected     Human Rhinovirus/Enterovirus Not Detected     Influenza B PCR Not Detected     Parainfluenza Virus 1 Not Detected     Parainfluenza Virus 2 Not Detected     Parainfluenza Virus 3 Not Detected     Parainfluenza Virus 4 Not Detected     Bordetella pertussis pcr Not Detected     Influenza A H1 2009 PCR Not Detected     Chlamydophila pneumoniae PCR Not Detected     Mycoplasma pneumo by PCR Not Detected     Influenza A PCR Not Detected     Influenza A H3 Not Detected     Influenza A H1 Not Detected     RSV, PCR Not Detected    Narrative:       The coronavirus on the RVP is NOT COVID-19 and is NOT indicative of infection with COVID-19.     CORONAVIRUS-19(COVID-19),RT-PCR,[facility] - Swab, Nasopharynx [[id number]]  (Abnormal) Collected:  03/24/20 1801    Lab Status:  Final result Specimen:  Swab from Nasopharynx Updated:  03/26/20 1202     Reference Lab Report --     COVID19 Detected           IMAGING & OTHER STUDIES    Imaging Results (Last 72 Hours)     ** No results found for the last 72 hours. **                ASSESSMENT/PLAN:   SARS Co-V2/COVID 19 Lower respiratory tract infection  - Started on  Hydroxychloroquine plus Zmax given clinical deterioration despite supportive care. However Plaquenil stopped due to blurry vision.   -Now with supportive care improvement noted  -Also noted Improvement in inflammatory markers. Continue to follow Ferritin/LDH/CRP.  -BNP within limits.   -Continue to improve clinically.  Now being monitored without antibiotics  -Infectious disease also following    Acute hypoxic respiratory failure  -Currently on 2 L nasal cannula.  Wean supplemental oxygen as appropriate.  Evaluate need for supplemental oxygen at discharge    Asthma acute exacerbation  - Continue Ventolin and Symbicort. Avoiding systemic steroids.  Patient was using Breo and xopenex at home    Moderate Obesity-BMI 38    DVT ppy Lovenox.     Increase activity.    Labs reviewed.  And are stable.    No objection to discharge from pulmonary standpoint.  Will need 6-minute walk prior to discharge to evaluate need for supplemental oxygen.  Will recommend outpatient follow-up in pulmonary clinic for ongoing asthma management and possible sleep apnea evaluation.  Some desaturation at night with sleep.  Discussed with patient in detail.  Patient believes that she should be able to do quarantine at home after discharge.

## 2020-04-02 NOTE — DISCHARGE SUMMARY
Date of Admission: 3/24/2020    Date of Discharge:  4/2/2020    Length of stay:  LOS: 7 days     Patient was examined with relevant and adequate PPE keeping in mind the current coronavirus pandemic.      Presenting Problem/History of Present Illness   Present on Admission:  • Pneumonia due to COVID-19 virus  • Acute respiratory failure with hypoxia (CMS/HCC)  • Morbidly obese (CMS/[facility])        Hospital Course    Chief Complaint   Patient presents with   • Fever       Renetta Ortiz 40 y.o. female.      Brief Synopsis of Hospital Course/HPI     Ms. Ortiz is a 40 y.o. with a past medical history of asthma and fibromyalgia who presents to [facility] 3/24 complaining of cough, fever, congestion x 4 days.  The patient was seen in the ED last night for vomiting and was discharged home with phenergan suppositories and Bentyl.  She states the Bentyl helped with abdominal cramping, but she was unable to figure out how to use the phenergan.  She states she has had fevers between  F at home.  She was attempting to take Tylenol and Advil, but was unable to keep it down.  Today, she has been dry heaving all day, bringing her to the ED.  She denies ill contacts or recent travel.  She states she does have a high risk daughter and they were recently at [facility] last week.  She reports her daughter has spinal muscular dystrophy.  She asked about calling them to let them know she is a potential rule out given all of the people she was exposed to there.  She is a .       In the ED, the patient's labs included the following: Na 143, K 3.6, BUN 6, Cr 0.60, glucose 85, WBC 3.3, hgb 13.3, plts 184.  .  CRP 4.48.  Respiratory panel negative.  CXR negative.  CT sinus negative.  CT abd/pelvis: bilateral lower lobe groundglass opacities seen with viral pneumonia.  Covid pending.  The patient was given Zofran, 1 L NS bolus and started on Rocephin in the ED.  She was  admitted for further evaluation and management.           Confirmed covid 19  Patient states symptomatically no better.  Still on 6 L.  Not on any home oxygen.  Non-smoker.     On 2 L oxygen now.  Feels breathing a little better.     Feels better.  On room air.  Incessant dry cough.  Patient received supportive care during the hospital stay.  She did not tolerate hydroxychloroquine treatment.  Consult was obtained from Dr. Chavarria.  He did not recommend any additional treatment as well.    ROS  Review of Systems   Constitution: Negative.   HENT: Negative.    Eyes: Negative.    Cardiovascular: Negative.    Respiratory: Positive for shortness of breath.    Endocrine: Negative.    Hematologic/Lymphatic: Negative.    Skin: Negative.    Musculoskeletal: Negative.    Gastrointestinal: Negative.    Genitourinary: Negative.    Neurological: Negative.    Psychiatric/Behavioral: Negative.    Allergic/Immunologic: Negative.    All other systems reviewed and are negative.  No change 04/02/20, 4:54 PM         Family History   Problem Relation Age of Onset   • No Known Problems Mother    • No Known Problems Father         Past Medical History:   Diagnosis Date   • Asthma    • Fibromyalgia        Past Surgical History:   Procedure Laterality Date   • TONSILLECTOMY         Social History     Socioeconomic History   • Marital status:      Spouse name: Not on file   • Number of children: Not on file   • Years of education: Not on file   • Highest education level: Not on file   Tobacco Use   • Smoking status: Never Smoker   • Smokeless tobacco: Never Used   Substance and Sexual Activity   • Alcohol use: Never     Frequency: Never   • Drug use: Never       Vital Signs  Temp:  [98.4 °F (36.9 °C)-98.8 °F (37.1 °C)] 98.4 °F (36.9 °C)  Heart Rate:  [] 90  Resp:  [16-20] 18  BP: (101-116)/(66-81) 112/77    Physical Exam:  Physical Exam  Physical Exam   Constitutional: She is oriented to person, place, and time. She appears  well-developed. No distress.   Morbidly obese   HENT:   Head: Normocephalic and atraumatic.   Right Ear: External ear normal.   Left Ear: External ear normal.   Nose: Nose normal.   Mouth/Throat: Oropharynx is clear and moist. No oropharyngeal exudate.   Eyes: Pupils are equal, round, and reactive to light. Conjunctivae and EOM are normal. Right eye exhibits no discharge. Left eye exhibits no discharge. No scleral icterus.   Neck: Normal range of motion. No JVD present. No tracheal deviation present. No thyromegaly present.   Cardiovascular: Normal rate, regular rhythm, normal heart sounds and intact distal pulses. Exam reveals no gallop and no friction rub.   No murmur heard.  Pulmonary/Chest: Effort normal and breath sounds normal. No stridor. No respiratory distress. She has no wheezes. She has no rales. She exhibits no tenderness.   Diminished air entry bilaterally   Abdominal: Soft. Bowel sounds are normal. She exhibits no distension and no mass. There is no tenderness. There is no rebound and no guarding. No hernia.   Musculoskeletal: Normal range of motion. She exhibits no edema, tenderness or deformity.   Lymphadenopathy:     She has no cervical adenopathy.   Neurological: She is alert and oriented to person, place, and time. No cranial nerve deficit or sensory deficit. She exhibits normal muscle tone. Coordination normal.   Skin: Skin is warm and dry. No rash noted. She is not diaphoretic. No erythema.   Psychiatric: She has a normal mood and affect. Her behavior is normal.   Nursing note and vitals reviewed.  No change in examination from April 1          Discharge Diagnosis:     Active Hospital Problems    Diagnosis  POA   • **Pneumonia due to COVID-19 virus [U07.1, J12.89]  Yes     Priority: High   • Acute respiratory failure with hypoxia (CMS/HCC) [J96.01]  Yes     Priority: Medium   • Morbidly obese (CMS/[facility]) [E66.01]  Yes   • Asthma [J45.909]  No      Resolved Hospital Problems   No resolved problems  to display.       Estimated Creatinine Clearance: 158.4 mL/min (A) (by C-G formula based on SCr of 0.56 mg/dL (L)).    Discharge Disposition    Destination      Coordination has not been started for this encounter.      Durable Medical Equipment      Service Provider Request Status Selected Services Address Phone Number Fax Number    [facility] Accepted N/A [address], [place] [address] [phone] --      Dialysis/Infusion      Coordination has not been started for this encounter.      Home Medical Care      Coordination has not been started for this encounter.      Therapy      Coordination has not been started for this encounter.      Community Resources      Coordination has not been started for this encounter.              PT Recommendation and Plan          Home or Self Care           Discharge Medications      New Medications      Instructions Start Date   albuterol sulfate  (90 Base) MCG/ACT inhaler  Commonly known as:  PROVENTIL HFA;VENTOLIN HFA;PROAIR HFA   2 puffs, Inhalation, Every 4 Hours PRN      benzonatate 200 MG capsule  Commonly known as:  TESSALON   200 mg, Oral, 3 Times Daily PRN      guaiFENesin-codeine 100-10 MG/5ML liquid  Commonly known as:  GUAIFENESIN AC   5 mL, Oral, Every 4 Hours PRN         Changes to Medications      Instructions Start Date   promethazine 25 MG suppository  Commonly known as:  PHENERGAN  What changed:  how much to take   12.5 mg, Rectal, Every 6 Hours PRN         Continue These Medications      Instructions Start Date   Breo Ellipta 200-25 MCG/INH inhaler  Generic drug:  Fluticasone Furoate-Vilanterol   1 puff, Inhalation, Daily - RT      dicyclomine 10 MG capsule  Commonly known as:  BENTYL   10 mg, Oral, 3 Times Daily PRN      loratadine 10 MG tablet  Commonly known as:  CLARITIN   10 mg, Oral, Every Evening      montelukast 10 MG tablet  Commonly known as:  SINGULAIR   10 mg, Oral, Nightly      omeprazole 40 MG  capsule  Commonly known as:  priLOSEC   40 mg, Oral, Every Evening      ondansetron ODT 4 MG disintegrating tablet  Commonly known as:  ZOFRAN-ODT   4 mg, Translingual, Every 8 Hours PRN      pregabalin 100 MG capsule  Commonly known as:  LYRICA   100 mg, Oral, 2 Times Daily           Discharge medications personally reviewed by me and med rec done by me personally.  04/02/20, 4:53 PM        Consults:   Consults     Date and Time Order Name Status Description    3/30/2020 1753 Inpatient Infectious Diseases Consult Completed     3/24/2020 2003 Hospitalist (on-call MD unless specified) Completed           Procedures Performed:         Pertinent Test Results:   Results from last 7 days   Lab Units 04/02/20  0321 04/01/20  0350 03/31/20  0234   WBC 10*3/mm3 6.90 5.40 4.80   HEMOGLOBIN g/dL 12.1 12.0 11.4*   HEMATOCRIT % 35.2 35.7 33.8*   MCV fL 86.0 88.3 86.1   MCH pg 29.5 29.6 28.9   PLATELETS 10*3/mm3 390 334 300     Results from last 7 days   Lab Units 04/02/20  0321 04/01/20  0350 03/31/20  0234  03/28/20  0343   SODIUM mmol/L 142 138 143   < > 143   POTASSIUM mmol/L 4.5 3.8 3.8   < > 4.3   CHLORIDE mmol/L 106 100 103   < > 105   CO2 mmol/L 23.0 25.0 28.0   < > 26.0   BUN mg/dL 11 5* 6   < > 5*   CREATININE mg/dL 0.56* 0.51* 0.52*   < > 0.58   CALCIUM mg/dL 9.1 8.7 8.8   < > 8.7   BILIRUBIN mg/dL  --  0.2 0.2  --  0.2   ALK PHOS U/L  --  114 85  --  82   ALT (SGPT) U/L  --  141* 86*  --  65*   AST (SGOT) U/L  --  113* 49*  --  66*   GLUCOSE mg/dL 98 96 95   < > 132*    < > = values in this interval not displayed.         Lab Results   Component Value Date    CALCIUM 9.1 04/02/2020     No results found for: HGBA1C  No results found for: CHOL, CHLPL, TRIG, HDL, LDL, LDLDIRECT  Lab Results   Component Value Date    LIPASE 39 03/24/2020           No results found for: INTRAOP, PREDX, FINALDX, COMDX  COVID19   Date Value Ref Range Status   03/24/2020 Detected (C) Not Detected, Invalid Final        Microbiology Results  (last 10 days)     Procedure Component Value - Date/Time    Legionella Antigen, Urine - Urine, Urine, Clean Catch [[id number]]  (Normal) Collected:  03/26/20 1539    Lab Status:  Final result Specimen:  Urine, Clean Catch Updated:  03/26/20 1607     LEGIONELLA ANTIGEN, URINE Negative    S. Pneumo Ag Urine or CSF - Urine, Urine, Clean Catch [[id number]]  (Normal) Collected:  03/26/20 1539    Lab Status:  Final result Specimen:  Urine, Clean Catch Updated:  03/26/20 1607     Strep Pneumo Ag Negative    Blood Culture - Blood, Arm, Right [[id number]] Collected:  03/24/20 2057    Lab Status:  Final result Specimen:  Blood from Arm, Right Updated:  03/29/20 2100     Blood Culture No growth at 5 days    Blood Culture - Blood, Arm, Left [[id number]] Collected:  03/24/20 2024    Lab Status:  Final result Specimen:  Blood from Arm, Left Updated:  03/29/20 2045     Blood Culture No growth at 5 days    Respiratory Panel, PCR - Swab, Nasopharynx [[id number]]  (Normal) Collected:  03/24/20 1801    Lab Status:  Final result Specimen:  Swab from Nasopharynx Updated:  03/24/20 1949     ADENOVIRUS, PCR Not Detected     Coronavirus 229E Not Detected     Coronavirus HKU1 Not Detected     Coronavirus NL63 Not Detected     Coronavirus OC43 Not Detected     Human Metapneumovirus Not Detected     Human Rhinovirus/Enterovirus Not Detected     Influenza B PCR Not Detected     Parainfluenza Virus 1 Not Detected     Parainfluenza Virus 2 Not Detected     Parainfluenza Virus 3 Not Detected     Parainfluenza Virus 4 Not Detected     Bordetella pertussis pcr Not Detected     Influenza A H1 2009 PCR Not Detected     Chlamydophila pneumoniae PCR Not Detected     Mycoplasma pneumo by PCR Not Detected     Influenza A PCR Not Detected     Influenza A H3 Not Detected     Influenza A H1 Not Detected     RSV, PCR Not Detected    Narrative:       The coronavirus on the RVP is NOT COVID-19 and is NOT indicative of infection with COVID-19.      CORONAVIRUS-19(COVID-19),RT-PCR,[facility] - Swab, Nasopharynx [[id number]]  (Abnormal) Collected:  03/24/20 1801    Lab Status:  Final result Specimen:  Swab from Nasopharynx Updated:  03/26/20 1202     Reference Lab Report --     COVID19 Detected          ECG/EMG Results (most recent)     Procedure Component Value Units Date/Time    ECG 12 Lead [[id number]] Collected:  03/27/20 0711     Updated:  03/27/20 0715    Narrative:       HEART RATE= 118  bpm  RR Interval= 508  ms  OK Interval= 133  ms  P Horizontal Axis= 23  deg  P Front Axis= 35  deg  QRSD Interval= 70  ms  QT Interval= 309  ms  QRS Axis= -13  deg  T Wave Axis= 1  deg  - BORDERLINE ECG -  Sinus tachycardia  Low voltage, precordial leads  Consider anterior infarct  When compared with ECG of 26-Mar-2020 16:54:30,  No significant change  Electronically Signed By:   Date and Time of Study: 2020-03-27 07:11:52    ECG 12 Lead [[id number]] Collected:  03/26/20 1654     Updated:  03/28/20 1035    Narrative:       HEART RATE= 109  bpm  RR Interval= 548  ms  OK Interval= 136  ms  P Horizontal Axis= 24  deg  P Front Axis= 30  deg  QRSD Interval= 72  ms  QT Interval= 316  ms  QRS Axis= -11  deg  T Wave Axis= 10  deg  - BORDERLINE ECG -  Sinus tachycardia  Low voltage, precordial leads  No previous ECG available for comparison  Baseline wander in V3 noted  Electronically Signed By: Kem Khan (ROSA) 28-Mar-2020 10:33:50  Date and Time of Study: 2020-03-26 16:54:30                    Ct Chest Without Contrast    Result Date: 3/26/2020  Patchy groundglass opacities throughout both lungs are consistent with the patient's diagnosis of COVID 19. Groundglass opacities in the lung bases have increased compared to 03/24/2020 CT abdomen and pelvis.    Electronically Signed By-Jannet Morrow On:3/26/2020 12:12 PM This report was finalized on [id number] by  Jannet Morrow, .      Xrays, labs reviewed personally by me.  04/02/20  4:53 PM      Condition on Discharge:     Stable    Discharge Diet:   Dietary Orders (From admission, onward)     Start     Ordered    03/24/20 2145  Diet Regular  Diet Effective Now     Question:  Diet / Texture / Consistency  Answer:  Regular    03/24/20 2144                Activity at Discharge:   Activity Instructions     Activity as Tolerated            Follow-up Appointments  No future appointments.  Additional Instructions for the Follow-ups that You Need to Schedule     Discharge Follow-up with PCP   As directed       Currently Documented PCP:    Wilver Jovel MD    PCP Phone Number:    [phone]     Follow Up Details:  If no PCP, call MD finder at [phone]               Test Results Pending at Discharge       Risk for Readmission (LACE) Score: 11 (4/2/2020  6:00 AM)          Trav Flores MD  04/02/20  16:53    Time: Greater than 30 minutes in discharge activity for care coordination with nursing and  multiple times to arrange for home oxygen and current care and discharge planning.

## 2020-04-02 NOTE — PROGRESS NOTES
Continued Stay Note  [facility]     Patient Name: Renetta Otriz  MRN: [id number]  Today's Date: 4/2/2020    Admit Date: 3/24/2020    Discharge Plan     Row Name 04/02/20 1103       Plan    Plan  Continue to anticipate routine home but with possible home O2. Patient now on 2L NC.    Plan Comments  Barriers to discharge: Positive for Covid 19. Patient on 2L O2 NC. Patient not at baseline. Not medically ready for discharge.            Anna Nae[place] [address]   [phone]  office  [phone]  fax  [email or web address]

## 2020-04-02 NOTE — PROGRESS NOTES
Continued Stay Note  [facility]     Patient Name: Renetta Ortiz  MRN: [id number]  Today's Date: 4/2/2020    Admit Date: 3/24/2020    Discharge Plan     Row Name 04/02/20 1132       Plan    Plan  New referral for [facility] Reeves for home O2. Patient requiring O2 between 1 to 2 L O2 NC. Sent via Epic.     Plan Comments  Patient expected to discharge today.          Anna Naegele RN Case Manager  [id number] [place] [address]   [phone]  office  [phone]  fax  [email or web address]

## 2020-04-02 NOTE — NURSING NOTE
Patient resting on room air with a saturation of 92%. Patient walking on room air with O2 saturation of 88%. O2 added and patient came up to 92% on 2L O2

## 2020-04-02 NOTE — DISCHARGE PLACEMENT REQUEST
"Sebastián Damon (40 y.o. Female)     Date of Birth Social Security Number Address Home Phone MRN    1980  [id number] [place] [address] [phone] [id number]    Latter day Marital Status          None        Admission Date Admission Type Admitting Provider Attending Provider Department, Room/Bed    3/24/20 Emergency Trav Flores MD Gill, Ravi, MD [facility] SURGICAL INPATIENT, 4101/1    Discharge Date Discharge Disposition Discharge Destination                       Attending Provider:  Trav Flores MD    Allergies:  Bactrim [Sulfamethoxazole-trimethoprim], Penicillins    Isolation:  Enh Drop/Con   Infection:  COVID (confirmed) (03/26/20)   Code Status:  CPR    Ht:  162.6 cm (64\")   Wt:  104 kg (228 lb 2.8 oz)    Admission Cmt:  None   Principal Problem:  Pneumonia due to COVID-19 virus [U07.1,J12.89]                 Active Insurance as of 3/24/2020     Primary Coverage     Payor Plan Insurance Group Employer/Plan Group    [facility] [id number]     Payor Plan Address Payor Plan Phone Number Payor Plan Fax Number Effective Dates    [address] [address] [phone]  11/1/2016 - None Entered    [facility] [id number]       Subscriber Name Subscriber Birth Date Member ID       SEBASTIÁN DAMON 1980 [id number]                 Emergency Contacts      (Rel.) Home Phone Work Phone Mobile Phone    anni byrd (Mother) -- -- [phone]              "

## 2020-04-03 ENCOUNTER — READMISSION MANAGEMENT (OUTPATIENT)
Dept: CALL CENTER | Facility: HOSPITAL | Age: 40
End: 2020-04-03

## 2020-04-03 PROCEDURE — 93010 ELECTROCARDIOGRAM REPORT: CPT | Performed by: INTERNAL MEDICINE

## 2020-04-03 NOTE — PROGRESS NOTES
Case Management Discharge Note      Final Note: home    Provided Post Acute Provider List?: N/A         Final Discharge Disposition Code: 01 - home or self-care

## 2020-04-03 NOTE — PAYOR COMM NOTE
"[id number]  Approved IP 3/26/20-4/7/20  DC notification  Discharge date: 4/02/2020 to home    RETURN CONTACT:  COURTNEY CANNON RN  [facility]  U../  PH:[phone]  FAX:[phone]    Sebastián Damon (40 y.o. Female)     Date of Birth Social Security Number Address Home Phone MRN    1980  [id number]  [place] [address] [phone] [id number]    [employer] Marital Status          None        Admission Date Admission Type Admitting Provider Attending Provider Department, Room/Bed    3/24/20 Emergency Trav Flores MD  [facility] SURGICAL INPATIENT, 4101/1    Discharge Date Discharge Disposition Discharge Destination        4/2/2020 Home or Self Care Home             Attending Provider:  (none)   Allergies:  Bactrim [Sulfamethoxazole-trimethoprim], Penicillins    Isolation:  Enh Drop/Con   Infection:  COVID (confirmed) (03/26/20)   Code Status:  Prior    Ht:  162.6 cm (64\")   Wt:  104 kg (228 lb 2.8 oz)    Admission Cmt:  None   Principal Problem:  Pneumonia due to COVID-19 virus [U07.1,J12.89]                 Active Insurance as of 3/24/2020     Primary Coverage     Payor Plan Insurance Group Employer/Plan Group    [facility] [id number]     Payor Plan Address Payor Plan Phone Number Payor Plan Fax Number Effective Dates    [address] [address] [phone]  11/1/2016 - None Entered    [facility] [id number]       Subscriber Name Subscriber Birth Date Member ID       SEBASTIÁN DAMON 1980 [id number]                 Emergency Contacts      (Rel.) Home Phone Work Phone Mobile Phone    anni byrd (Mother) -- -- [phone]               Discharge Summary      Trav Flores MD at 04/02/20 9889            Date of Admission: 3/24/2020    Date of Discharge:  4/2/2020    Length of stay:  LOS: 7 days     Patient was examined with relevant and adequate PPE keeping in mind the current coronavirus pandemic.      Presenting Problem/History of Present " Illness   Present on Admission:  • Pneumonia due to COVID-19 virus  • Acute respiratory failure with hypoxia (CMS/HCC)  • Morbidly obese (CMS/[facility])        Hospital Course    Chief Complaint   Patient presents with   • Fever       Renetta Ortiz 40 y.o. female.      Brief Synopsis of Hospital Course/HPI     Ms. Ortiz is a 40 y.o. with a past medical history of asthma and fibromyalgia who presents to [facility] 3/24 complaining of cough, fever, congestion x 4 days.  The patient was seen in the ED last night for vomiting and was discharged home with phenergan suppositories and Bentyl.  She states the Bentyl helped with abdominal cramping, but she was unable to figure out how to use the phenergan.  She states she has had fevers between  F at home.  She was attempting to take Tylenol and Advil, but was unable to keep it down.  Today, she has been dry heaving all day, bringing her to the ED.  She denies ill contacts or recent travel.  She states she does have a high risk daughter and they were recently at [facility] last week.  She reports her daughter has spinal muscular dystrophy.  She asked about calling them to let them know she is a potential rule out given all of the people she was exposed to there.  She is a .       In the ED, the patient's labs included the following: Na 143, K 3.6, BUN 6, Cr 0.60, glucose 85, WBC 3.3, hgb 13.3, plts 184.  .  CRP 4.48.  Respiratory panel negative.  CXR negative.  CT sinus negative.  CT abd/pelvis: bilateral lower lobe groundglass opacities seen with viral pneumonia.  Covid pending.  The patient was given Zofran, 1 L NS bolus and started on Rocephin in the ED.  She was admitted for further evaluation and management.           Confirmed covid 19  Patient states symptomatically no better.  Still on 6 L.  Not on any home oxygen.  Non-smoker.     On 2 L oxygen now.  Feels breathing a little better.     Feels better.   On room air.  Incessant dry cough.  Patient received supportive care during the hospital stay.  She did not tolerate hydroxychloroquine treatment.  Consult was obtained from Dr. Chavarria.  He did not recommend any additional treatment as well.    ROS  Review of Systems   Constitution: Negative.   HENT: Negative.    Eyes: Negative.    Cardiovascular: Negative.    Respiratory: Positive for shortness of breath.    Endocrine: Negative.    Hematologic/Lymphatic: Negative.    Skin: Negative.    Musculoskeletal: Negative.    Gastrointestinal: Negative.    Genitourinary: Negative.    Neurological: Negative.    Psychiatric/Behavioral: Negative.    Allergic/Immunologic: Negative.    All other systems reviewed and are negative.  No change 04/02/20, 4:54 PM         Family History   Problem Relation Age of Onset   • No Known Problems Mother    • No Known Problems Father         Past Medical History:   Diagnosis Date   • Asthma    • Fibromyalgia        Past Surgical History:   Procedure Laterality Date   • TONSILLECTOMY         Social History     Socioeconomic History   • Marital status:      Spouse name: Not on file   • Number of children: Not on file   • Years of education: Not on file   • Highest education level: Not on file   Tobacco Use   • Smoking status: Never Smoker   • Smokeless tobacco: Never Used   Substance and Sexual Activity   • Alcohol use: Never     Frequency: Never   • Drug use: Never       Vital Signs  Temp:  [98.4 °F (36.9 °C)-98.8 °F (37.1 °C)] 98.4 °F (36.9 °C)  Heart Rate:  [] 90  Resp:  [16-20] 18  BP: (101-116)/(66-81) 112/77    Physical Exam:  Physical Exam  Physical Exam   Constitutional: She is oriented to person, place, and time. She appears well-developed. No distress.   Morbidly obese   HENT:   Head: Normocephalic and atraumatic.   Right Ear: External ear normal.   Left Ear: External ear normal.   Nose: Nose normal.   Mouth/Throat: Oropharynx is clear and moist. No oropharyngeal exudate.      Eyes: Pupils are equal, round, and reactive to light. Conjunctivae and EOM are normal. Right eye exhibits no discharge. Left eye exhibits no discharge. No scleral icterus.   Neck: Normal range of motion. No JVD present. No tracheal deviation present. No thyromegaly present.   Cardiovascular: Normal rate, regular rhythm, normal heart sounds and intact distal pulses. Exam reveals no gallop and no friction rub.   No murmur heard.  Pulmonary/Chest: Effort normal and breath sounds normal. No stridor. No respiratory distress. She has no wheezes. She has no rales. She exhibits no tenderness.   Diminished air entry bilaterally   Abdominal: Soft. Bowel sounds are normal. She exhibits no distension and no mass. There is no tenderness. There is no rebound and no guarding. No hernia.   Musculoskeletal: Normal range of motion. She exhibits no edema, tenderness or deformity.   Lymphadenopathy:     She has no cervical adenopathy.   Neurological: She is alert and oriented to person, place, and time. No cranial nerve deficit or sensory deficit. She exhibits normal muscle tone. Coordination normal.   Skin: Skin is warm and dry. No rash noted. She is not diaphoretic. No erythema.   Psychiatric: She has a normal mood and affect. Her behavior is normal.   Nursing note and vitals reviewed.  No change in examination from April 1          Discharge Diagnosis:     Active Hospital Problems    Diagnosis  POA   • **Pneumonia due to COVID-19 virus [U07.1, J12.89]  Yes     Priority: High   • Acute respiratory failure with hypoxia (CMS/HCC) [J96.01]  Yes     Priority: Medium   • Morbidly obese (CMS/HCC) [E66.01]  Yes   • Asthma [J45.909]  No      Resolved Hospital Problems   No resolved problems to display.       Estimated Creatinine Clearance: 158.4 mL/min (A) (by C-G formula based on SCr of 0.56 mg/dL (L)).    Discharge Disposition    Destination      Coordination has not been started for this encounter.      Durable Medical Equipment       Service Provider Request Status Selected Services Address Phone Number Fax Number    [facility] N[facility] [address] [facility] [place] [address] [phone] --      Dialysis/Infusion      Coordination has not been started for this encounter.      Home Medical Care      Coordination has not been started for this encounter.      Therapy      Coordination has not been started for this encounter.      Community Resources      Coordination has not been started for this encounter.              PT Recommendation and Plan          Home or Self Care           Discharge Medications      New Medications      Instructions Start Date   albuterol sulfate  (90 Base) MCG/ACT inhaler  Commonly known as:  PROVENTIL HFA;VENTOLIN HFA;PROAIR HFA   2 puffs, Inhalation, Every 4 Hours PRN      benzonatate 200 MG capsule  Commonly known as:  TESSALON   200 mg, Oral, 3 Times Daily PRN      guaiFENesin-codeine 100-10 MG/5ML liquid  Commonly known as:  GUAIFENESIN AC   5 mL, Oral, Every 4 Hours PRN         Changes to Medications      Instructions Start Date   promethazine 25 MG suppository  Commonly known as:  PHENERGAN  What changed:  how much to take   12.5 mg, Rectal, Every 6 Hours PRN         Continue These Medications      Instructions Start Date   Breo Ellipta 200-25 MCG/INH inhaler  Generic drug:  Fluticasone Furoate-Vilanterol   1 puff, Inhalation, Daily - RT      dicyclomine 10 MG capsule  Commonly known as:  BENTYL   10 mg, Oral, 3 Times Daily PRN      loratadine 10 MG tablet  Commonly known as:  CLARITIN   10 mg, Oral, Every Evening      montelukast 10 MG tablet  Commonly known as:  SINGULAIR   10 mg, Oral, Nightly      omeprazole 40 MG capsule  Commonly known as:  priLOSEC   40 mg, Oral, Every Evening      ondansetron ODT 4 MG disintegrating tablet  Commonly known as:  ZOFRAN-ODT   4 mg, Translingual, Every 8 Hours PRN      pregabalin 100 MG capsule  Commonly known as:  LYRICA   100 mg, Oral, 2  Times Daily           Discharge medications personally reviewed by me and med rec done by me personally.  04/02/20, 4:53 PM        Consults:   Consults     Date and Time Order Name Status Description    3/30/2020 1753 Inpatient Infectious Diseases Consult Completed     3/24/2020 2003 Hospitalist (on-call MD unless specified) Completed           Procedures Performed:         Pertinent Test Results:   Results from last 7 days   Lab Units 04/02/20 0321 04/01/20 0350 03/31/20  0234   WBC 10*3/mm3 6.90 5.40 4.80   HEMOGLOBIN g/dL 12.1 12.0 11.4*   HEMATOCRIT % 35.2 35.7 33.8*   MCV fL 86.0 88.3 86.1   MCH pg 29.5 29.6 28.9   PLATELETS 10*3/mm3 390 334 300     Results from last 7 days   Lab Units 04/02/20 0321 04/01/20 0350 03/31/20 0234 03/28/20  0343   SODIUM mmol/L 142 138 143   < > 143   POTASSIUM mmol/L 4.5 3.8 3.8   < > 4.3   CHLORIDE mmol/L 106 100 103   < > 105   CO2 mmol/L 23.0 25.0 28.0   < > 26.0   BUN mg/dL 11 5* 6   < > 5*   CREATININE mg/dL 0.56* 0.51* 0.52*   < > 0.58   CALCIUM mg/dL 9.1 8.7 8.8   < > 8.7   BILIRUBIN mg/dL  --  0.2 0.2  --  0.2   ALK PHOS U/L  --  114 85  --  82   ALT (SGPT) U/L  --  141* 86*  --  65*   AST (SGOT) U/L  --  113* 49*  --  66*   GLUCOSE mg/dL 98 96 95   < > 132*    < > = values in this interval not displayed.         Lab Results   Component Value Date    CALCIUM 9.1 04/02/2020     No results found for: HGBA1C  No results found for: CHOL, CHLPL, TRIG, HDL, LDL, LDLDIRECT  Lab Results   Component Value Date    LIPASE 39 03/24/2020           No results found for: INTRAOP, PREDX, FINALDX, COMDX  COVID19   Date Value Ref Range Status   03/24/2020 Detected (C) Not Detected, Invalid Final        Microbiology Results (last 10 days)     Procedure Component Value - Date/Time    Legionella Antigen, Urine - Urine, Urine, Clean Catch [[id number]]  (Normal) Collected:  03/26/20 1539    Lab Status:  Final result Specimen:  Urine, Clean Catch Updated:  03/26/20 1607     LEGIONELLA  ANTIGEN, URINE Negative    S. Pneumo Ag Urine or CSF - Urine, Urine, Clean Catch [[id number]]  (Normal) Collected:  03/26/20 1539    Lab Status:  Final result Specimen:  Urine, Clean Catch Updated:  03/26/20 1607     Strep Pneumo Ag Negative    Blood Culture - Blood, Arm, Right [[id number]] Collected:  03/24/20 2057    Lab Status:  Final result Specimen:  Blood from Arm, Right Updated:  03/29/20 2100     Blood Culture No growth at 5 days    Blood Culture - Blood, Arm, Left [[id number]] Collected:  03/24/20 2024    Lab Status:  Final result Specimen:  Blood from Arm, Left Updated:  03/29/20 2045     Blood Culture No growth at 5 days    Respiratory Panel, PCR - Swab, Nasopharynx [[id number]]  (Normal) Collected:  03/24/20 1801    Lab Status:  Final result Specimen:  Swab from Nasopharynx Updated:  03/24/20 1949     ADENOVIRUS, PCR Not Detected     Coronavirus 229E Not Detected     Coronavirus HKU1 Not Detected     Coronavirus NL63 Not Detected     Coronavirus OC43 Not Detected     Human Metapneumovirus Not Detected     Human Rhinovirus/Enterovirus Not Detected     Influenza B PCR Not Detected     Parainfluenza Virus 1 Not Detected     Parainfluenza Virus 2 Not Detected     Parainfluenza Virus 3 Not Detected     Parainfluenza Virus 4 Not Detected     Bordetella pertussis pcr Not Detected     Influenza A H1 2009 PCR Not Detected     Chlamydophila pneumoniae PCR Not Detected     Mycoplasma pneumo by PCR Not Detected     Influenza A PCR Not Detected     Influenza A H3 Not Detected     Influenza A H1 Not Detected     RSV, PCR Not Detected    Narrative:       The coronavirus on the RVP is NOT COVID-19 and is NOT indicative of infection with COVID-19.     CORONAVIRUS-19(COVID-19),RT-PCR,[facility] - Swab, Nasopharynx [[id number]]  (Abnormal) Collected:  03/24/20 1801    Lab Status:  Final result Specimen:  Swab from Nasopharynx Updated:  03/26/20 1202     Reference Lab Report --     COVID19 Detected          ECG/EMG Results (most  recent)     Procedure Component Value Units Date/Time    ECG 12 Lead [[id number]] Collected:  03/27/20 0711     Updated:  03/27/20 0715    Narrative:       HEART RATE= 118  bpm  RR Interval= 508  ms  AZ Interval= 133  ms  P Horizontal Axis= 23  deg  P Front Axis= 35  deg  QRSD Interval= 70  ms  QT Interval= 309  ms  QRS Axis= -13  deg  T Wave Axis= 1  deg  - BORDERLINE ECG -  Sinus tachycardia  Low voltage, precordial leads  Consider anterior infarct  When compared with ECG of 26-Mar-2020 16:54:30,  No significant change  Electronically Signed By:   Date and Time of Study: 2020-03-27 07:11:52    ECG 12 Lead [[id number]] Collected:  03/26/20 1654     Updated:  03/28/20 1035    Narrative:       HEART RATE= 109  bpm  RR Interval= 548  ms  AZ Interval= 136  ms  P Horizontal Axis= 24  deg  P Front Axis= 30  deg  QRSD Interval= 72  ms  QT Interval= 316  ms  QRS Axis= -11  deg  T Wave Axis= 10  deg  - BORDERLINE ECG -  Sinus tachycardia  Low voltage, precordial leads  No previous ECG available for comparison  Baseline wander in V3 noted  Electronically Signed By: Kem Khan (ROSA) 28-Mar-2020 10:33:50  Date and Time of Study: 2020-03-26 16:54:30                    Ct Chest Without Contrast    Result Date: 3/26/2020  Patchy groundglass opacities throughout both lungs are consistent with the patient's diagnosis of COVID 19. Groundglass opacities in the lung bases have increased compared to 03/24/2020 CT abdomen and pelvis.    Electronically Signed By-Jannet Morrow On:3/26/2020 12:12 PM This report was finalized on [id number] by  Jannet Morrow, .      Xrays, labs reviewed personally by me.  04/02/20  4:53 PM      Condition on Discharge:    Stable    Discharge Diet:   Dietary Orders (From admission, onward)     Start     Ordered    03/24/20 2145  Diet Regular  Diet Effective Now     Question:  Diet / Texture / Consistency  Answer:  Regular    03/24/20 2144                Activity at Discharge:   Activity Instructions      Activity as Tolerated            Follow-up Appointments  No future appointments.  Additional Instructions for the Follow-ups that You Need to Schedule     Discharge Follow-up with PCP   As directed       Currently Documented PCP:    Wilver Jovel MD    PCP Phone Number:    [phone]     Follow Up Details:  If no PCP, call MD finder at [phone]               Test Results Pending at Discharge       Risk for Readmission (LACE) Score: 11 (4/2/2020  6:00 AM)          Trav Flores MD  04/02/20  16:53    Time: Greater than 30 minutes in discharge activity for care coordination with nursing and  multiple times to arrange for home oxygen and current care and discharge planning.      Electronically signed by Trav Flores MD at 04/02/20 4505

## 2020-04-03 NOTE — OUTREACH NOTE
Prep Survey      Responses   [facility] facility patient discharged from?  [facility]   Is LACE score < 7 ?  No   Eligibility  Readm Mgmt   Discharge diagnosis  Pneumonia d/t COVID-19 virus, acute resp. failure with hypoxia, morbidly obese   Does the patient have one of the following disease processes/diagnoses(primary or secondary)?  COPD/Pneumonia   Does the patient have Home health ordered?  No   Is there a DME ordered?  Yes   What DME was ordered?  [employer] for home O2   Comments regarding appointments  Pt to schedule F/U appointments   Prep survey completed?  Yes          Tanesha Villalba RN

## 2020-04-04 ENCOUNTER — READMISSION MANAGEMENT (OUTPATIENT)
Dept: CALL CENTER | Facility: HOSPITAL | Age: 40
End: 2020-04-04

## 2020-04-05 ENCOUNTER — READMISSION MANAGEMENT (OUTPATIENT)
Dept: CALL CENTER | Facility: HOSPITAL | Age: 40
End: 2020-04-05

## 2020-04-05 NOTE — OUTREACH NOTE
COPD/PN Week 1 Survey      Responses   [facility] patient discharged from?  [facility]   Does the patient have one of the following disease processes/diagnoses(primary or secondary)?  COPD/Pneumonia   Is there a successful TCM telephone encounter documented?  No   Was the primary reason for admission:  Pneumonia   Week 1 attempt successful?  Yes   Call start time  1548   Call end time  1556   Discharge diagnosis  Pneumonia d/t COVID-19 virus, acute resp. failure with hypoxia, morbidly obese   Meds reviewed with patient/caregiver?  Yes   Is the patient having any side effects they believe may be caused by any medication additions or changes?  No   Does the patient have all medications ordered at discharge?  Yes   Is the patient taking all medications as directed (includes completed medication regime)?  Yes   Does the patient have a primary care provider?   Yes   Does the patient have an appointment with their PCP or pulmonologist within 7 days of discharge?  Greater than 7 days   What is preventing the patient from scheduling follow up appointments within 7 days of discharge?  Earlier appointment not available   Nursing Interventions  Educated patient on importance of making appointment   Has the patient kept scheduled appointments due by today?  N/A   Has home health visited the patient within 72 hours of discharge?  N/A   What DME was ordered?  [employer] for home O2   Has all DME been delivered?  Yes   Psychosocial issues?  No   Comments  2L for 2 full weeks and then off, energy level is low.   Did the patient receive a copy of their discharge instructions?  Yes   Nursing interventions  Reviewed instructions with patient   What is the patient's perception of their health status since discharge?  Improving   Nursing Interventions  Nurse provided patient education   Are the patient's immunizations up to date?   Yes   Nursing interventions  Educated on importance of maintaining up to date immunizations as  advised by provider   Is the patient/caregiver able to teach back the hierarchy of who to call/visit for symptoms/problems? PCP, Specialist, Home health nurse, Urgent Care, ED, 911  Yes   Additional teach back comments  Messaged PCP for new neb machine, orthostatic HTNs are better, She cleaned a shower today-so improved   Is the patient/caregiver able to teach back signs and symptoms of worsening condition:  Fever/chills, Shortness of breath, Chest pain   Is the patient/caregiver able to teach back importance of completing antibiotic course of treatment?  Yes   Week 1 call completed?  Yes   Wrap up additional comments  She is still having some shortness of air.          Doretha Corbett RN

## 2020-04-06 ENCOUNTER — READMISSION MANAGEMENT (OUTPATIENT)
Dept: CALL CENTER | Facility: HOSPITAL | Age: 40
End: 2020-04-06

## 2020-04-06 NOTE — OUTREACH NOTE
COPD/PN Week 1 Survey      Responses   [facility] patient discharged from?  [facility]   Does the patient have one of the following disease processes/diagnoses(primary or secondary)?  COPD/Pneumonia   Is there a successful TCM telephone encounter documented?  No   Was the primary reason for admission:  Pneumonia   Week 1 attempt successful?  Yes   Call start time  7193   Revoke  Decline to participate          Doretha Corbett RN

## 2021-03-03 ENCOUNTER — APPOINTMENT (OUTPATIENT)
Dept: GENERAL RADIOLOGY | Facility: HOSPITAL | Age: 41
End: 2021-03-03

## 2021-03-03 ENCOUNTER — HOSPITAL ENCOUNTER (EMERGENCY)
Facility: HOSPITAL | Age: 41
Discharge: HOME | End: 2021-03-03
Attending: EMERGENCY MEDICINE | Admitting: EMERGENCY MEDICINE

## 2021-03-03 ENCOUNTER — APPOINTMENT (OUTPATIENT)
Dept: CT IMAGING | Facility: HOSPITAL | Age: 41
End: 2021-03-03

## 2021-03-03 VITALS
SYSTOLIC BLOOD PRESSURE: 123 MMHG | HEART RATE: 78 BPM | BODY MASS INDEX: 38.24 KG/M2 | RESPIRATION RATE: 19 BRPM | WEIGHT: 224 LBS | DIASTOLIC BLOOD PRESSURE: 83 MMHG | HEIGHT: 64 IN | OXYGEN SATURATION: 100 % | TEMPERATURE: 98.1 F

## 2021-03-03 DIAGNOSIS — S33.5XXA LUMBAR SPRAIN, INITIAL ENCOUNTER: ICD-10-CM

## 2021-03-03 DIAGNOSIS — S13.9XXA NECK SPRAIN, INITIAL ENCOUNTER: ICD-10-CM
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
## 2021-03-03 PROCEDURE — 99283 EMERGENCY DEPT VISIT LOW MDM: CPT

## 2021-03-03 PROCEDURE — 72125 CT NECK SPINE W/O DYE: CPT

## 2021-03-03 PROCEDURE — 70450 CT HEAD/BRAIN W/O DYE: CPT

## 2021-03-03 PROCEDURE — 72110 X-RAY EXAM L-2 SPINE 4/>VWS: CPT

## 2021-03-03 RX ORDER — IBUPROFEN 400 MG/1
800 TABLET ORAL ONCE
Status: COMPLETED | OUTPATIENT
Start: 2021-03-03 | End: 2021-03-03

## 2021-03-03 RX ADMIN — IBUPROFEN 800 MG: 400 TABLET, FILM COATED ORAL at 21:16
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
## 2021-04-23 ENCOUNTER — OFFICE (AMBULATORY)
Dept: URBAN - METROPOLITAN AREA PATHOLOGY 4 | Facility: PATHOLOGY | Age: 41
End: 2021-04-23
Payer: COMMERCIAL

## 2021-04-23 ENCOUNTER — ON CAMPUS - OUTPATIENT (AMBULATORY)
Dept: URBAN - METROPOLITAN AREA HOSPITAL 2 | Facility: HOSPITAL | Age: 41
End: 2021-04-23
Payer: COMMERCIAL

## 2021-04-23 VITALS
HEART RATE: 85 BPM | SYSTOLIC BLOOD PRESSURE: 126 MMHG | HEART RATE: 74 BPM | RESPIRATION RATE: 16 BRPM | DIASTOLIC BLOOD PRESSURE: 79 MMHG | OXYGEN SATURATION: 99 % | SYSTOLIC BLOOD PRESSURE: 118 MMHG | SYSTOLIC BLOOD PRESSURE: 98 MMHG | HEART RATE: 80 BPM | OXYGEN SATURATION: 94 % | SYSTOLIC BLOOD PRESSURE: 117 MMHG | TEMPERATURE: 97.2 F | DIASTOLIC BLOOD PRESSURE: 53 MMHG | HEIGHT: 64 IN | WEIGHT: 230 LBS | OXYGEN SATURATION: 96 % | HEART RATE: 73 BPM | OXYGEN SATURATION: 100 % | SYSTOLIC BLOOD PRESSURE: 148 MMHG | DIASTOLIC BLOOD PRESSURE: 74 MMHG | DIASTOLIC BLOOD PRESSURE: 89 MMHG | DIASTOLIC BLOOD PRESSURE: 80 MMHG

## 2021-04-23 DIAGNOSIS — K21.9 GASTRO-ESOPHAGEAL REFLUX DISEASE WITHOUT ESOPHAGITIS: ICD-10-CM

## 2021-04-23 DIAGNOSIS — K31.9 DISEASE OF STOMACH AND DUODENUM, UNSPECIFIED: ICD-10-CM

## 2021-04-23 DIAGNOSIS — K44.9 DIAPHRAGMATIC HERNIA WITHOUT OBSTRUCTION OR GANGRENE: ICD-10-CM

## 2021-04-23 DIAGNOSIS — K31.89 OTHER DISEASES OF STOMACH AND DUODENUM: ICD-10-CM

## 2021-04-23 PROBLEM — K22.8 OTHER SPECIFIED DISEASES OF ESOPHAGUS: Status: ACTIVE | Noted: 2021-04-23

## 2021-04-23 LAB
GI HISTOLOGY: A. SELECT: (no result)
GI HISTOLOGY: B. UNSPECIFIED: (no result)
GI HISTOLOGY: PDF REPORT: (no result)

## 2021-04-23 PROCEDURE — 43239 EGD BIOPSY SINGLE/MULTIPLE: CPT | Performed by: INTERNAL MEDICINE

## 2021-04-23 PROCEDURE — 88305 TISSUE EXAM BY PATHOLOGIST: CPT | Performed by: INTERNAL MEDICINE

## 2021-05-13 ENCOUNTER — OFFICE (AMBULATORY)
Dept: URBAN - METROPOLITAN AREA CLINIC 64 | Facility: CLINIC | Age: 41
End: 2021-05-13
Payer: COMMERCIAL

## 2021-05-13 VITALS
HEIGHT: 64 IN | DIASTOLIC BLOOD PRESSURE: 66 MMHG | WEIGHT: 233 LBS | HEART RATE: 83 BPM | SYSTOLIC BLOOD PRESSURE: 120 MMHG

## 2021-05-13 DIAGNOSIS — R14.0 ABDOMINAL DISTENSION (GASEOUS): ICD-10-CM

## 2021-05-13 DIAGNOSIS — K44.9 DIAPHRAGMATIC HERNIA WITHOUT OBSTRUCTION OR GANGRENE: ICD-10-CM

## 2021-05-13 DIAGNOSIS — K21.00 GASTRO-ESOPHAGEAL REFLUX DISEASE WITH ESOPHAGITIS, WITHOUT B: ICD-10-CM

## 2021-05-13 DIAGNOSIS — K29.70 GASTRITIS, UNSPECIFIED, WITHOUT BLEEDING: ICD-10-CM

## 2021-05-13 PROBLEM — K21.9 GERD - GASTROESOPHAGEAL REFLUX DISEASE: Status: ACTIVE | Noted: 2021-04-23

## 2021-05-13 PROBLEM — K20.80 OTHER ESOPHAGITIS WITHOUT BLEEDING: Status: ACTIVE | Noted: 2021-04-23

## 2021-05-13 PROCEDURE — 99213 OFFICE O/P EST LOW 20 MIN: CPT | Performed by: NURSE PRACTITIONER

## 2021-05-13 RX ORDER — SUCRALFATE 1 G/10ML
800 SUSPENSION ORAL
Qty: 1600 | Refills: 6 | Status: ACTIVE
Start: 2021-05-13

## 2021-08-27 ENCOUNTER — OFFICE (AMBULATORY)
Dept: URBAN - METROPOLITAN AREA CLINIC 64 | Facility: CLINIC | Age: 41
End: 2021-08-27
Payer: COMMERCIAL

## 2021-08-27 VITALS
DIASTOLIC BLOOD PRESSURE: 76 MMHG | WEIGHT: 241 LBS | SYSTOLIC BLOOD PRESSURE: 107 MMHG | HEIGHT: 64 IN | HEART RATE: 69 BPM

## 2021-08-27 DIAGNOSIS — R14.0 ABDOMINAL DISTENSION (GASEOUS): ICD-10-CM

## 2021-08-27 DIAGNOSIS — K21.00 GASTRO-ESOPHAGEAL REFLUX DISEASE WITH ESOPHAGITIS, WITHOUT B: ICD-10-CM

## 2021-08-27 PROCEDURE — 99213 OFFICE O/P EST LOW 20 MIN: CPT | Performed by: INTERNAL MEDICINE

## 2021-08-27 RX ORDER — FAMOTIDINE 40 MG/1
40 TABLET, FILM COATED ORAL
Qty: 30 | Refills: 11 | Status: ACTIVE
Start: 2021-08-27

## 2022-04-18 ENCOUNTER — LAB (OUTPATIENT)
Dept: LAB | Facility: HOSPITAL | Age: 42
End: 2022-04-18

## 2022-04-18 ENCOUNTER — TRANSCRIBE ORDERS (OUTPATIENT)
Dept: ADMINISTRATIVE | Facility: HOSPITAL | Age: 42
End: 2022-04-18

## 2022-04-18 DIAGNOSIS — M25.542 ARTHRALGIA OF HANDS, BILATERAL: ICD-10-CM

## 2022-04-18 DIAGNOSIS — M25.541 ARTHRALGIA OF HANDS, BILATERAL: ICD-10-CM

## 2022-04-18 DIAGNOSIS — M25.542 ARTHRALGIA OF HANDS, BILATERAL: Primary | ICD-10-CM

## 2022-04-18 DIAGNOSIS — M25.541 ARTHRALGIA OF HANDS, BILATERAL: Primary | ICD-10-CM

## 2022-04-18 LAB
CHROMATIN AB SERPL-ACNC: <10 IU/ML (ref 0–14)
CRP SERPL-MCNC: 1.48 MG/DL (ref 0–0.5)
ERYTHROCYTE [SEDIMENTATION RATE] IN BLOOD: 10 MM/HR (ref 0–20)

## 2022-04-18 PROCEDURE — 85652 RBC SED RATE AUTOMATED: CPT

## 2022-04-18 PROCEDURE — 86038 ANTINUCLEAR ANTIBODIES: CPT

## 2022-04-18 PROCEDURE — 36415 COLL VENOUS BLD VENIPUNCTURE: CPT

## 2022-04-18 PROCEDURE — 86140 C-REACTIVE PROTEIN: CPT

## 2022-04-18 PROCEDURE — 86431 RHEUMATOID FACTOR QUANT: CPT

## 2022-04-19 LAB
ANA SER QL IF: NEGATIVE
LABORATORY COMMENT REPORT: NORMAL